# Patient Record
Sex: FEMALE | Race: BLACK OR AFRICAN AMERICAN | Employment: FULL TIME | URBAN - METROPOLITAN AREA
[De-identification: names, ages, dates, MRNs, and addresses within clinical notes are randomized per-mention and may not be internally consistent; named-entity substitution may affect disease eponyms.]

---

## 2017-03-03 ENCOUNTER — OFFICE VISIT (OUTPATIENT)
Dept: INTERNAL MEDICINE CLINIC | Facility: CLINIC | Age: 22
End: 2017-03-03

## 2017-03-03 VITALS
SYSTOLIC BLOOD PRESSURE: 100 MMHG | BODY MASS INDEX: 28.93 KG/M2 | HEIGHT: 66 IN | RESPIRATION RATE: 18 BRPM | DIASTOLIC BLOOD PRESSURE: 63 MMHG | HEART RATE: 86 BPM | TEMPERATURE: 98.4 F | WEIGHT: 180 LBS

## 2017-03-03 DIAGNOSIS — Z76.89 ENCOUNTER TO ESTABLISH CARE: ICD-10-CM

## 2017-03-03 DIAGNOSIS — Z13.31 DEPRESSION SCREENING: ICD-10-CM

## 2017-03-03 DIAGNOSIS — Z30.016 ENCOUNTER FOR INITIAL PRESCRIPTION OF TRANSDERMAL PATCH HORMONAL CONTRACEPTIVE DEVICE: ICD-10-CM

## 2017-03-03 DIAGNOSIS — F90.9 ATTENTION DEFICIT HYPERACTIVITY DISORDER (ADHD), UNSPECIFIED ADHD TYPE: Primary | ICD-10-CM

## 2017-03-03 LAB
HCG URINE, QL. (POC): NEGATIVE
VALID INTERNAL CONTROL?: YES

## 2017-03-03 RX ORDER — DEXTROAMPHETAMINE SACCHARATE, AMPHETAMINE ASPARTATE MONOHYDRATE, DEXTROAMPHETAMINE SULFATE AND AMPHETAMINE SULFATE 7.5; 7.5; 7.5; 7.5 MG/1; MG/1; MG/1; MG/1
30 CAPSULE, EXTENDED RELEASE ORAL
Qty: 30 CAP | Refills: 0 | Status: SHIPPED | OUTPATIENT
Start: 2017-03-03 | End: 2017-04-03 | Stop reason: SDUPTHER

## 2017-03-03 RX ORDER — DEXTROAMPHETAMINE SACCHARATE, AMPHETAMINE ASPARTATE MONOHYDRATE, DEXTROAMPHETAMINE SULFATE AND AMPHETAMINE SULFATE 7.5; 7.5; 7.5; 7.5 MG/1; MG/1; MG/1; MG/1
30 CAPSULE, EXTENDED RELEASE ORAL
COMMUNITY
End: 2017-03-03 | Stop reason: SDUPTHER

## 2017-03-03 NOTE — PROGRESS NOTES
Chief Complaint   Patient presents with   [address]     would like to discuss birth controll, and needs a medication refill. 1. Have you been to the ER, urgent care clinic since your last visit? Hospitalized since your last visit? No    2. Have you seen or consulted any other health care providers outside of the [address] [place] [address] since your last visit? Include any pap smears or colon screening. No     Do you have an [address] in place in the event that you have a healthcare crisis that could impact your decision making as it pertains to your health? NO    Would you like information about Advance Care Planning? NO    Information given.  NO

## 2017-03-03 NOTE — PATIENT INSTRUCTIONS
Intrauterine Device (IUD) for Birth Control: Care Instructions  Your Care Instructions    The intrauterine device (IUD) is used to prevent pregnancy. It's a small, plastic, T-shaped device. Your doctor places the IUD in your uterus. You are using either a hormonal IUD or a copper IUD. · Hormonal IUDs prevent pregnancy for 3 to 5 years, depending on which IUD is used. Once you have it, you don't have to do anything else to prevent pregnancy. · The copper IUD prevents pregnancy for 10 years. Once you have it, you don't have to do anything to prevent pregnancy. A string tied to the end of the IUD hangs down through the opening of the uterus (called the cervix) into the vagina. You can check that the IUD is in place by feeling for the string. The IUD usually stays in the uterus until your doctor removes it. Follow-up care is a key part of your treatment and safety. Be sure to make and go to all appointments, and call your doctor if you are having problems. It's also a good idea to know your test results and keep a list of the medicines you take. How can you care for yourself at home? How do you use the IUD? · Your doctor inserts the IUD. This takes only a few minutes and can be done at your doctor's office. · Your doctor may have you feel for the IUD string right after insertion, to be sure you know what it feels like. · Check for the string after every period. ¨ Insert a finger into your vagina and feel for the cervix, which is at the top of the vagina and feels harder than the rest of your vagina (some women say it feels like the tip of your nose). ¨ You should be able to feel the thin, plastic string coming out of the opening of your cervix. If you cannot feel the string, it doesn't always mean that the IUD is out of place. Sometimes the string is just difficult to feel or has been pulled up into the cervical canal (which will not harm you).   · Your doctor may want to see you 4 to 6 weeks after the IUD insertion, to make sure it is in place. What if you think the IUD is not in place? Always read the label for specific instructions. Here are some basic guidelines:  · Call your doctor and use backup birth control, such as a condom, or don't have intercourse until you know the IUD is working. · If you have had intercourse, you can use emergency contraception, such as the morning-after pill (Plan B). You can use emergency contraception for up to 5 days after having had intercourse, but it works best if you take it right away. What else do you need to know? · The IUD has side effects. ¨ The hormonal IUD usually reduces menstrual flow and cramping over time. It can also cause spotting, mood swings, and breast tenderness. ¨ The copper IUD can cause longer and heavier periods. · After an IUD is first put in, you may have some mild cramping and light spotting for 1 to 2 days. · The IUD doesn't protect against sexually transmitted infections (STIs), such as herpes or HIV/AIDS. If you're not sure whether your sex partner might have an STI, use a condom to protect against disease. When should you call for help? Call your doctor now or seek immediate medical care if:  · You have severe pain in your belly or pelvis. · You have severe vaginal bleeding. This means that you are soaking through your usual pads or tampons each hour for 2 or more hours. · You have vaginal discharge that smells bad. · You have a fever and chills. · You think you might be pregnant. Watch closely for changes in your health, and be sure to contact your doctor if:  · You cannot find the string of your IUD, or the string is shorter or longer than normal.  · You have any problems with your birth control method. · You think you may have been exposed to or have a sexually transmitted infection. Where can you learn more? Go to http://aparna-darlene.info/.   Enter O222 in the search box to learn more about \"Intrauterine Device (IUD) for Birth Control: Care Instructions. \"  Current as of: May 30, 2016  Content Version: 11.1  © 2108-5039 [employer]. Care instructions adapted under license by [employer] (which disclaims liability or warranty for this information). If you have questions about a medical condition or this instruction, always ask your healthcare professional. [address] any warranty or liability for your use of this information. Learning About Birth Control: The Patch  What is the patch? The patch is used to prevent pregnancy. It looks like a bandage and is put on the skin of your belly, rear end (buttocks), upper arm, or upper body (but not on a breast). The patch releases a regular dose of the hormones estrogen and progestin. These hormones prevent pregnancy in three ways. They thicken the mucus in the cervix. This makes it hard for sperm to travel into the uterus. They thin the lining of the uterus, which makes it harder for a fertilized egg to attach to the uterus. The hormones also can stop the ovaries from releasing an egg each month (ovulation). The patch provides birth control for 1 month at a time. You change the patch once a week for 3 weeks and then go without a patch for 1 week. During this week, you have your period. Your period may be very light. How well does it work? In the first year of use:  · When the patch is used exactly as directed, fewer than 1 woman out of 100 has an unplanned pregnancy. · When the patch is not used exactly as directed, 9 women out of 100 have an unplanned pregnancy. Be sure to tell your doctor about any health problems you have or medicines you take. He or she can help you choose the birth control method that is right for you. What are the advantages of the patch? · The patch is more effective for preventing pregnancy than barrier methods of birth control, such as the condom or diaphragm.   · It may reduce acne, heavy bleeding and cramping, and symptoms of premenstrual syndrome. · It's convenient. You put it on only 3 times each month. You do not have to interrupt sex to protect against pregnancy. · It's easy to check to see if you forgot to put one on. What are the disadvantages of the patch? · The patch doesn't protect against sexually transmitted infections (STIs), such as herpes or HIV/AIDS. If you aren't sure if your sex partner might have an STI, use a condom to protect against disease. · The patch may cause changes in your period. You may have little bleeding, skipped periods, or spotting. · It may cause mood changes, less interest in sex, or weight gain. · The patch contains estrogen. It may not be right for you if you have certain health problems or concerns. · It may increase your risk of blood clots. · It may be less effective in women who are overweight. · You must remember to change the patch on schedule. Where can you learn more? Go to http://aparna-darlene.info/. Enter E774 in the search box to learn more about \"Learning About Birth Control: The Patch. \"  Current as of: May 30, 2016  Content Version: 11.1  © 3635-4961 [employer]. Care instructions adapted under license by [employer] (which disclaims liability or warranty for this information). If you have questions about a medical condition or this instruction, always ask your healthcare professional. [place] [address] any warranty or liability for your use of this information.

## 2017-03-03 NOTE — PROGRESS NOTES
Subjective:      Melchor Lacy is a 23 y.o. female who is a new patient and is here to establish care and get medication refills. Previous followed by Dr. Juan Luis Luong in [place]. The following sections were reviewed & updated as appropriate: PMH, PL, PSH, FH, RxH, and SH. Mental Health Review  Patient is seen for ADHD. She was diagnosed sophomore year in college. Current treatment regimen includes: Adderall XR 30mg daily at 7am.  Medication compliance: all of the time. Pt reports the following side effects: nothing. [facility]  reviewed. Birth Control: she has been on this in the past, 2 different types but she cannot remember the name. She states both times she was nauseous. She states her cycles are mostly regular, coming every 4 weeks for 4-5 days. She is due for a PAP but states she has not had any irregular results. The patient denies smoking cigarettes, is not currently pregnant, does not have a history of blood clots or stroke, has never had breast cancer, does not have irregular or heavy periods, does not have liver or heart disease, and does not have a history of migraine headaches. Patient Active Problem List    Diagnosis Date Noted    ADHD (attention deficit hyperactivity disorder) 03/03/2017     Current Outpatient Prescriptions   Medication Sig Dispense Refill    [START ON 3/4/2017] norelgestromin-ethinyl estradiol (ORTHO EVRA) 150-35 mcg/24 hr 1 Patch by TransDERmal route Every Saturday. 12 Patch 0    amphetamine-dextroamphetamine XR (ADDERALL XR) 30 mg XR capsule Take 1 Cap (30 mg total) by mouth every morning. Max Daily Amount: 30 mg 30 Cap 0     No Known Allergies  History reviewed. No pertinent past medical history.   Family History   Problem Relation Age of Onset    No Known Problems Mother     Arthritis-osteo Father     Cancer Maternal Grandmother      breast    Cancer Paternal Grandfather      thyroid cancer- questionable history     Social History   Substance Use Topics    Smoking status: Never Smoker    Smokeless tobacco: Not on file    Alcohol use Yes        Review of Systems    A comprehensive review of systems was negative except for that written in the HPI. Objective:     Visit Vitals    /63 (BP 1 Location: Left arm, BP Patient Position: Sitting)    Pulse 86    Temp 98.4 °F (36.9 °C) (Oral)    Resp 18    Ht 5' 5.5\" (1.664 m)    Wt 180 lb (81.6 kg)    LMP 02/03/2017 (Exact Date)    BMI 29.5 kg/m2     General appearance: alert, cooperative, no distress, appears stated age  Lungs: clear to auscultation bilaterally  Heart: regular rate and rhythm, S1, S2 normal, no murmur, click, rub or gallop  Extremities: extremities normal, atraumatic, no cyanosis or edema  Neurologic: Grossly normal  Psych: appropriate speech, mood, affect  Nursing note and vitals reviewed  Assessment/Plan:       ICD-10-CM ICD-9-CM    1. Attention deficit hyperactivity disorder (ADHD), unspecified ADHD type F90.9 314.01 amphetamine-dextroamphetamine XR (ADDERALL XR) 30 mg XR capsule   2. Encounter for initial prescription of transdermal patch hormonal contraceptive device Z30.016 V25.02 norelgestromin-ethinyl estradiol (ORTHO EVRA) 150-35 mcg/24 hr      REFERRAL TO GYNECOLOGY      AMB POC URINE PREGNANCY TEST, VISUAL COLOR COMPARISON   3. Encounter to establish care Z76.89 V65.8    4. Depression screening Z13.89 V79.0      She will return for PAP of follow up with GYN for this asap, soonest appointment. She requests birth control in the interval due to sexual activity that is not always with a condom. Follow-up Disposition:  Return for Schedule your annual physical with fasting labs. Advised her to call back or return to office if symptoms worsen/change/persist.  Discussed expected course/resolution/complications of diagnosis in detail with patient. Medication risks/benefits/costs/interactions/alternatives discussed with patient.   She was given an after visit summary which includes diagnoses, current medications, & vitals. She expressed understanding with the diagnosis and plan.

## 2017-03-03 NOTE — MR AVS SNAPSHOT
Visit Information Date & Time Provider Department Dept. Phone Encounter #  
 3/3/2017 10:30 AM Billy Ibarra NP [facility] Internal Medicine [phone] [id number] Follow-up Instructions Return for Schedule your annual physical with fasting labs. Upcoming Health Maintenance Date Due  
 HPV AGE 9Y-34Y (1 of 3 - Female 3 Dose Series) 1/4/2004 DTaP/Tdap/Td series (1 - Tdap) 1/4/2014 PAP AKA CERVICAL CYTOLOGY 1/4/2014 INFLUENZA AGE 9 TO ADULT 8/1/2016 Allergies as of 3/3/2017  Review Complete On: 3/3/2017 By: Billy Ibarra NP No Known Allergies Current Immunizations  Never Reviewed No immunizations on file. Not reviewed this visit You Were Diagnosed With   
  
 Codes Comments Attention deficit hyperactivity disorder (ADHD), unspecified ADHD type    -  Primary ICD-10-CM: F90.9 ICD-9-CM: 314.01 Encounter for initial prescription of transdermal patch hormonal contraceptive device     ICD-10-CM: X83.253 ICD-9-CM: V25.02 Encounter to establish care     ICD-10-CM: Z76.89 
ICD-9-CM: V65.8 Vitals BP  
  
  
  
  
  
 100/63 (BP 1 Location: Left arm, BP Patient Position: Sitting) Vitals History BMI and BSA Data Body Mass Index Body Surface Area  
 29.5 kg/m 2 1.94 m 2 Preferred Pharmacy Pharmacy Name Phone [id number] [phone] Your Updated Medication List  
  
   
This list is accurate as of: 3/3/17 11:10 AM.  Always use your most recent med list.  
  
  
  
  
 amphetamine-dextroamphetamine XR 30 mg XR capsule Commonly known as:  ADDERALL XR Take 1 Cap (30 mg total) by mouth every morning. Max Daily Amount: 30 mg  
  
 norelgestromin-ethinyl estradiol 150-35 mcg/24 hr  
Commonly known as:  ORTHO EVRA  
1 Patch by TransDERmal route Every Saturday. Start taking on:  3/4/2017 Prescriptions Printed Refills  
 amphetamine-dextroamphetamine XR (ADDERALL XR) 30 mg XR capsule 0 Sig: Take 1 Cap (30 mg total) by mouth every morning. Max Daily Amount: 30 mg  
 Class: Print Route: Oral  
  
Prescriptions Sent to Pharmacy Refills  
 norelgestromin-ethinyl estradiol (ORTHO EVRA) 150-35 mcg/24 hr 0 Starting on: 3/4/2017 Si Patch by TransDERmal route Every Saturday. Class: Normal  
 Pharmacy: [address] [facility] Ph #: [phone] Route: TransDERmal  
  
We Performed the Following AMB POC URINE PREGNANCY TEST, VISUAL COLOR COMPARISON [36625 CPT(R)] REFERRAL TO GYNECOLOGY [REF30 Custom] Comments:  
 Please evaluate patient for IUD placement. Follow-up Instructions Return for Schedule your annual physical with fasting labs. Referral Information Referral ID Referred By Referred To  
  
 [id number] SKIFF, Lawernce Sol, GREYSON   
   [address] [facility], [address] Phone: [phone] Fax: [phone] Visits Status Start Date End Date 1 New Request 3/3/17 3/3/18 If your referral has a status of pending review or denied, additional information will be sent to support the outcome of this decision. Patient Instructions Intrauterine Device (IUD) for Birth Control: Care Instructions Your Care Instructions The intrauterine device (IUD) is used to prevent pregnancy. It's a small, plastic, T-shaped device. Your doctor places the IUD in your uterus. You are using either a hormonal IUD or a copper IUD. · Hormonal IUDs prevent pregnancy for 3 to 5 years, depending on which IUD is used. Once you have it, you don't have to do anything else to prevent pregnancy. · The copper IUD prevents pregnancy for 10 years. Once you have it, you don't have to do anything to prevent pregnancy. A string tied to the end of the IUD hangs down through the opening of the uterus (called the cervix) into the vagina. You can check that the IUD is in place by feeling for the string. The IUD usually stays in the uterus until your doctor removes it. Follow-up care is a key part of your treatment and safety. Be sure to make and go to all appointments, and call your doctor if you are having problems. It's also a good idea to know your test results and keep a list of the medicines you take. How can you care for yourself at home? How do you use the IUD? · Your doctor inserts the IUD. This takes only a few minutes and can be done at your doctor's office. · Your doctor may have you feel for the IUD string right after insertion, to be sure you know what it feels like. · Check for the string after every period. ¨ Insert a finger into your vagina and feel for the cervix, which is at the top of the vagina and feels harder than the rest of your vagina (some women say it feels like the tip of your nose). ¨ You should be able to feel the thin, plastic string coming out of the opening of your cervix. If you cannot feel the string, it doesn't always mean that the IUD is out of place. Sometimes the string is just difficult to feel or has been pulled up into the cervical canal (which will not harm you). · Your doctor may want to see you 4 to 6 weeks after the IUD insertion, to make sure it is in place. What if you think the IUD is not in place? Always read the label for specific instructions. Here are some basic guidelines: 
· Call your doctor and use backup birth control, such as a condom, or don't have intercourse until you know the IUD is working. · If you have had intercourse, you can use emergency contraception, such as the morning-after pill (Plan B). You can use emergency contraception for up to 5 days after having had intercourse, but it works best if you take it right away. What else do you need to know? · The IUD has side effects. ¨ The hormonal IUD usually reduces menstrual flow and cramping over time. It can also cause spotting, mood swings, and breast tenderness. ¨ The copper IUD can cause longer and heavier periods. · After an IUD is first put in, you may have some mild cramping and light spotting for 1 to 2 days. · The IUD doesn't protect against sexually transmitted infections (STIs), such as herpes or HIV/AIDS. If you're not sure whether your sex partner might have an STI, use a condom to protect against disease. When should you call for help? Call your doctor now or seek immediate medical care if: 
· You have severe pain in your belly or pelvis. · You have severe vaginal bleeding. This means that you are soaking through your usual pads or tampons each hour for 2 or more hours. · You have vaginal discharge that smells bad. · You have a fever and chills. · You think you might be pregnant. Watch closely for changes in your health, and be sure to contact your doctor if: 
· You cannot find the string of your IUD, or the string is shorter or longer than normal. 
· You have any problems with your birth control method. · You think you may have been exposed to or have a sexually transmitted infection. Where can you learn more? Go to http://aparna-darlene.info/. Enter G614 in the search box to learn more about \"Intrauterine Device (IUD) for Birth Control: Care Instructions. \" Current as of: May 30, 2016 Content Version: 11.1 © 0098-6661 ClickToShop. Care instructions adapted under license by 6Wunderkinder (which disclaims liability or warranty for this information). If you have questions about a medical condition or this instruction, always ask your healthcare professional. [place] [address] any warranty or liability for your use of this information. Learning About Birth Control: The Patch What is the patch? The patch is used to prevent pregnancy. It looks like a bandage and is put on the skin of your belly, rear end (buttocks), upper arm, or upper body (but not on a breast). The patch releases a regular dose of the hormones estrogen and progestin. These hormones prevent pregnancy in three ways. They thicken the mucus in the cervix. This makes it hard for sperm to travel into the uterus. They thin the lining of the uterus, which makes it harder for a fertilized egg to attach to the uterus. The hormones also can stop the ovaries from releasing an egg each month (ovulation). The patch provides birth control for 1 month at a time. You change the patch once a week for 3 weeks and then go without a patch for 1 week. During this week, you have your period. Your period may be very light. How well does it work? In the first year of use: · When the patch is used exactly as directed, fewer than 1 woman out of 100 has an unplanned pregnancy. · When the patch is not used exactly as directed, 9 women out of 100 have an unplanned pregnancy. Be sure to tell your doctor about any health problems you have or medicines you take. He or she can help you choose the birth control method that is right for you. What are the advantages of the patch? · The patch is more effective for preventing pregnancy than barrier methods of birth control, such as the condom or diaphragm. · It may reduce acne, heavy bleeding and cramping, and symptoms of premenstrual syndrome. · It's convenient. You put it on only 3 times each month. You do not have to interrupt sex to protect against pregnancy. · It's easy to check to see if you forgot to put one on. What are the disadvantages of the patch? · The patch doesn't protect against sexually transmitted infections (STIs), such as herpes or HIV/AIDS. If you aren't sure if your sex partner might have an STI, use a condom to protect against disease. · The patch may cause changes in your period. You may have little bleeding, skipped periods, or spotting. · It may cause mood changes, less interest in sex, or weight gain. · The patch contains estrogen. It may not be right for you if you have certain health problems or concerns. · It may increase your risk of blood clots. · It may be less effective in women who are overweight. · You must remember to change the patch on schedule. Where can you learn more? Go to http://aparna-darlene.info/. Enter K558 in the search box to learn more about \"Learning About Birth Control: The Patch. \" 
Current as of: May 30, 2016 Content Version: 11.1 © 7165-4621 ViaBill. Care instructions adapted under license by Powered Now (which disclaims liability or warranty for this information). If you have questions about a medical condition or this instruction, always ask your healthcare professional. [place] [address] any warranty or liability for your use of this information. Introducing [facility] & HEALTH SERVICES! [facility] introduces Automile patient portal. Now you can access parts of your medical record, email your doctor's office, and request medication refills online. 1. In your internet browser, go to [email or web address]/Convey Computer 2. Click on the First Time User? Click Here link in the Sign In box. You will see the New Member Sign Up page. 3. Enter your Automile Access Code exactly as it appears below. You will not need to use this code after youve completed the sign-up process. If you do not sign up before the expiration date, you must request a new code. · Automile Access Code: [email or web address] Expires: 6/1/2017 10:21 AM 
 
4. Enter the last four digits of your Social Security Number (xxxx) and Date of Birth (mm/dd/yyyy) as indicated and click Submit. You will be taken to the next sign-up page. 5. Create a LegCyte ID. This will be your LegCyte login ID and cannot be changed, so think of one that is secure and easy to remember. 6. Create a LegCyte password. You can change your password at any time. 7. Enter your Password Reset Question and Answer. This can be used at a later time if you forget your password. 8. Enter your e-mail address. You will receive e-mail notification when new information is available in [address] 9. Click Sign Up. You can now view and download portions of your medical record. 10. Click the Download Summary menu link to download a portable copy of your medical information. If you have questions, please visit the Frequently Asked Questions section of the LegCyte website. Remember, LegCyte is NOT to be used for urgent needs. For medical emergencies, dial 911. Now available from your iPhone and Android! Please provide this summary of care documentation to your next provider. Your primary care clinician is listed as Brynn Heath. If you have any questions after today's visit, please call [phone].

## 2017-03-07 ENCOUNTER — OFFICE VISIT (OUTPATIENT)
Dept: INTERNAL MEDICINE CLINIC | Facility: CLINIC | Age: 22
End: 2017-03-07

## 2017-03-07 VITALS
WEIGHT: 182.4 LBS | SYSTOLIC BLOOD PRESSURE: 111 MMHG | OXYGEN SATURATION: 98 % | TEMPERATURE: 99.2 F | BODY MASS INDEX: 29.32 KG/M2 | HEIGHT: 66 IN | DIASTOLIC BLOOD PRESSURE: 73 MMHG | RESPIRATION RATE: 18 BRPM | HEART RATE: 83 BPM

## 2017-03-07 DIAGNOSIS — E66.3 OVERWEIGHT (BMI 25.0-29.9): ICD-10-CM

## 2017-03-07 DIAGNOSIS — Z00.00 ENCOUNTER FOR GENERAL ADULT MEDICAL EXAMINATION W/O ABNORMAL FINDINGS: Primary | ICD-10-CM

## 2017-03-07 NOTE — MR AVS SNAPSHOT
Visit Information Date & Time Provider Department Dept. Phone Encounter #  
 3/7/2017  9:45 AM John Caraballo NP [facility] Internal Medicine [phone] Follow-up Instructions Return in about 3 months (around 6/7/2017) for ADHD, please sign medical release for HPV records. Upcoming Health Maintenance Date Due  
 HPV AGE 9Y-34Y (1 of 3 - Female 3 Dose Series) 1/4/2004 DTaP/Tdap/Td series (1 - Tdap) 1/4/2014 PAP AKA CERVICAL CYTOLOGY 1/4/2014 Allergies as of 3/7/2017  Review Complete On: 3/7/2017 By: John Caraballo NP No Known Allergies Current Immunizations  Reviewed on 3/7/2017 No immunizations on file. Reviewed by John Caraballo NP on 3/7/2017 at 10:03 AM  
You Were Diagnosed With   
  
 Codes Comments Encounter for general adult medical examination w/o abnormal findings    -  Primary ICD-10-CM: Z00.00 ICD-9-CM: V70.9 Overweight (BMI 25.0-29. 9)     ICD-10-CM: Y65.8 ICD-9-CM: 278.02 Vitals BP Pulse Temp Resp Height(growth percentile) Weight(growth percentile) 111/73 (BP 1 Location: Left arm, BP Patient Position: Sitting) 83 99.2 °F (37.3 °C) (Oral) 18 5' 5.5\" (1.664 m) 182 lb 6.4 oz (82.7 kg) LMP SpO2 BMI OB Status Smoking Status 03/01/2017 98% 29.89 kg/m2 Having regular periods Never Smoker Vitals History BMI and BSA Data Body Mass Index Body Surface Area  
 29.89 kg/m 2 1.95 m 2 Preferred Pharmacy Pharmacy Name Phone [id number] [phone] Your Updated Medication List  
  
   
This list is accurate as of: 3/7/17 10:12 AM.  Always use your most recent med list.  
  
  
  
  
 amphetamine-dextroamphetamine XR 30 mg XR capsule Commonly known as:  ADDERALL XR Take 1 Cap (30 mg total) by mouth every morning.   Max Daily Amount: 30 mg  
  
 norelgestromin-ethinyl estradiol 150-35 mcg/24 hr  
 Commonly known as:  ORTHO EVRA  
1 Patch by TransDERmal route Every Saturday. We Performed the Following CBC WITH AUTOMATED DIFF [77485 CPT(R)] LIPID PANEL [03680 CPT(R)] METABOLIC PANEL, COMPREHENSIVE [74051 CPT(R)] TSH 3RD GENERATION [47822 CPT(R)] Follow-up Instructions Return in about 3 months (around 6/7/2017) for ADHD, please sign medical release for HPV records. Patient Instructions Starting a Weight Loss Plan: Care Instructions Your Care Instructions If you are thinking about losing weight, it can be hard to know where to start. Your doctor can help you set up a weight loss plan that best meets your needs. You may want to take a class on nutrition or exercise, or join a weight loss support group. If you have questions about how to make changes to your eating or exercise habits, ask your doctor about seeing a registered dietitian or an exercise specialist. 
It can be a big challenge to lose weight. But you do not have to make huge changes at once. Make small changes, and stick with them. When those changes become habit, add a few more changes. If you do not think you are ready to make changes right now, try to pick a date in the future. Make an appointment to see your doctor to discuss whether the time is right for you to start a plan. Follow-up care is a key part of your treatment and safety. Be sure to make and go to all appointments, and call your doctor if you are having problems. Its also a good idea to know your test results and keep a list of the medicines you take. How can you care for yourself at home? · Set realistic goals. Many people expect to lose much more weight than is likely. A weight loss of 5% to 10% of your body weight may be enough to improve your health. · Get family and friends involved to provide support. Talk to them about why you are trying to lose weight, and ask them to help.  They can help by participating in exercise and having meals with you, even if they may be eating something different. · Find what works best for you. If you do not have time or do not like to cook, a program that offers meal replacement bars or shakes may be better for you. Or if you like to prepare meals, finding a plan that includes daily menus and recipes may be best. 
· Ask your doctor about other health professionals who can help you achieve your weight loss goals. ¨ A dietitian can help you make healthy changes in your diet. ¨ An exercise specialist or  can help you develop a safe and effective exercise program. 
¨ A counselor or psychiatrist can help you cope with issues such as depression, anxiety, or family problems that can make it hard to focus on weight loss. · Consider joining a support group for people who are trying to lose weight. Your doctor can suggest groups in your area. Where can you learn more? Go to http://aparna-darlene.info/. Enter J275 in the search box to learn more about \"Starting a Weight Loss Plan: Care Instructions. \" Current as of: February 16, 2016 Content Version: 11.1 © 6971-9368 EastMeetEast. Care instructions adapted under license by Dualog (which disclaims liability or warranty for this information). If you have questions about a medical condition or this instruction, always ask your healthcare professional. [address] any warranty or liability for your use of this information. Introducing [facility] & HEALTH SERVICES! [facility] introduces Dibbz patient portal. Now you can access parts of your medical record, email your doctor's office, and request medication refills online. 1. In your internet browser, go to [email or web address] Mobile [email or web address] [email or web address] 2. Click on the First Time User? Click Here link in the Sign In box. You will see the New Member Sign Up page. 3. Enter your Panda Graphics Access Code exactly as it appears below. You will not need to use this code after youve completed the sign-up process. If you do not sign up before the expiration date, you must request a new code. · Panda Graphics Access Code: [email or web address] Expires: 6/1/2017 10:21 AM 
 
4. Enter the last four digits of your Social Security Number (xxxx) and Date of Birth (mm/dd/yyyy) as indicated and click Submit. You will be taken to the next sign-up page. 5. Create a Panda Graphics ID. This will be your Panda Graphics login ID and cannot be changed, so think of one that is secure and easy to remember. 6. Create a Panda Graphics password. You can change your password at any time. 7. Enter your Password Reset Question and Answer. This can be used at a later time if you forget your password. 8. Enter your e-mail address. You will receive e-mail notification when new information is available in [address] 9. Click Sign Up. You can now view and download portions of your medical record. 10. Click the Download Summary menu link to download a portable copy of your medical information. If you have questions, please visit the Frequently Asked Questions section of the Panda Graphics website. Remember, Panda Graphics is NOT to be used for urgent needs. For medical emergencies, dial 911. Now available from your iPhone and Android! Please provide this summary of care documentation to your next provider. Your primary care clinician is listed as Sunshine Gabriel. If you have any questions after today's visit, please call [phone].

## 2017-03-07 NOTE — PROGRESS NOTES
Subjective:      Oksana Payne is a 23 y.o. female who presents today for CPE. Health Maintenance  Immunizations:    Influenza: up to date. Tetanus: unknown, record requested. Gardasil: up to date. Cancer screening:    Cervical: reviewed guidelines, not up to date - she has apt scheduled with GYN for this week. Breast: reviewed guidelines, not up to date - will be done by gyn. Reviewed SBE with her. BMI: she states she used to be 210lbs, she is trying to run regularly. She does not like fried foods and rarely eats fast food. Patient Care Team:  Sunshine Gabriel NP as PCP - General (Nurse Practitioner)     The following sections were reviewed & updated as appropriate: PMH, PSH, FH, and SH. Patient Active Problem List   Diagnosis Code    ADHD (attention deficit hyperactivity disorder) F90.9      Prior to Admission medications    Medication Sig Start Date End Date Taking? Authorizing Provider   amphetamine-dextroamphetamine XR (ADDERALL XR) 30 mg XR capsule Take 1 Cap (30 mg total) by mouth every morning. Max Daily Amount: 30 mg 3/3/17  Yes Sunshine Gabriel NP   norelgestromin-ethinyl estradiol (ORTHO EVRA) 150-35 mcg/24 hr 1 Patch by TransDERmal route Every Saturday. 3/4/17   Sunshine Gabriel NP      No Known Allergies     Family History   Problem Relation Age of Onset    No Known Problems Mother     Arthritis-osteo Father     Cancer Maternal Grandmother      breast    Cancer Paternal Grandfather      thyroid cancer- questionable history     Social History   Substance Use Topics    Smoking status: Never Smoker    Smokeless tobacco: Never Used    Alcohol use Yes        Review of Systems    A comprehensive review of systems was negative except for that written in the HPI.      Objective:     Visit Vitals    /73 (BP 1 Location: Left arm, BP Patient Position: Sitting)    Pulse 83    Temp 99.2 °F (37.3 °C) (Oral)    Resp 18    Ht 5' 5.5\" (1.664 m)    Wt 182 lb 6.4 oz (82.7 kg)    [facility] 03/01/2017    SpO2 98%    BMI 29.89 kg/m2     General:  Alert, cooperative, no distress, appears stated age. Head:  Normocephalic, without obvious abnormality, atraumatic. Eyes:  Conjunctivae/corneas clear. PERRL, EOMs intact. Ears:  Normal TMs and external ear canals both ears. Nose: Nares normal. Septum midline. Mucosa normal. No drainage or sinus tenderness. Throat: Lips, mucosa, and tongue normal. Teeth and gums normal.   Neck: Supple, symmetrical, trachea midline, no adenopathy, thyroid: no enlargement/tenderness/nodules. Back:   Symmetric, no curvature. ROM normal. No CVA tenderness. Lungs:   Clear to auscultation bilaterally. Chest wall:  No tenderness or deformity. Heart:  Regular rate and rhythm, S1, S2 normal, no murmur, click, rub or gallop. Abdomen:   Soft, non-tender. Bowel sounds normal. No masses,  No organomegaly. Extremities: Extremities normal, atraumatic, no cyanosis or edema. Pulses: 2+ and symmetric all extremities. Skin: Skin color, texture, turgor normal. No rashes or lesions. Lymph nodes: Cervical, supraclavicular nodes normal.   Neurologic: CNII-XII intact. Normal strength, sensation  throughout. Nursing note and vitals reviewed  Assessment/Plan:       ICD-10-CM ICD-9-CM    1. Encounter for general adult medical examination w/o abnormal findings Z00.00 V70.9 CBC WITH AUTOMATED DIFF      LIPID PANEL      METABOLIC PANEL, COMPREHENSIVE      TSH 3RD GENERATION   2. Overweight (BMI 25.0-29. 9) E66.3 278.02           Advised her to call back or return to office if symptoms worsen/change/persist.  Discussed expected course/resolution/complications of diagnosis in detail with patient. Medication risks/benefits/costs/interactions/alternatives discussed with patient. She was given an after visit summary which includes diagnoses, current medications, & vitals. She expressed understanding with the diagnosis and plan.

## 2017-03-07 NOTE — PATIENT INSTRUCTIONS

## 2017-03-07 NOTE — PROGRESS NOTES
Room 4    Chief Complaint   Patient presents with    Complete Physical     Patient has Pap scheduled for later this week     1. Have you been to the ER, urgent care clinic since your last visit? Hospitalized since your last visit? Yes Reason for visit: For pink eye, patient went to [facility] in February 2. Have you seen or consulted any other health care providers outside of the [address] [place] [address] since your last visit? Include any pap smears or colon screening. No     Health Maintenance Due   Topic Date Due    HPV AGE 9Y-34Y (1 of 3 - Female 3 Dose Series) 01/04/2004    DTaP/Tdap/Td series (1 - Tdap) 01/04/2014    PAP AKA CERVICAL CYTOLOGY  01/04/2014    INFLUENZA AGE 9 TO ADULT  08/01/2016     Do you have an [address] in place in the event that you have a healthcare crisis that could impact your decision making as it pertains to your health? NO    Would you like information about Advance Care Planning? NO    Information given.  NO

## 2017-03-08 LAB
ALBUMIN SERPL-MCNC: 4.2 G/DL (ref 3.5–5.5)
ALBUMIN/GLOB SERPL: 1.4 {RATIO} (ref 1.1–2.5)
ALP SERPL-CCNC: 57 IU/L (ref 39–117)
ALT SERPL-CCNC: 17 IU/L (ref 0–32)
AST SERPL-CCNC: 19 IU/L (ref 0–40)
BASOPHILS # BLD AUTO: 0 X10E3/UL (ref 0–0.2)
BASOPHILS NFR BLD AUTO: 0 %
BILIRUB SERPL-MCNC: <0.2 MG/DL (ref 0–1.2)
BUN SERPL-MCNC: 8 MG/DL (ref 6–20)
BUN/CREAT SERPL: 11 (ref 8–20)
CALCIUM SERPL-MCNC: 9 MG/DL (ref 8.7–10.2)
CHLORIDE SERPL-SCNC: 102 MMOL/L (ref 96–106)
CHOLEST SERPL-MCNC: 171 MG/DL (ref 100–199)
CO2 SERPL-SCNC: 24 MMOL/L (ref 18–29)
CREAT SERPL-MCNC: 0.71 MG/DL (ref 0.57–1)
EOSINOPHIL # BLD AUTO: 0 X10E3/UL (ref 0–0.4)
EOSINOPHIL NFR BLD AUTO: 1 %
ERYTHROCYTE [DISTWIDTH] IN BLOOD BY AUTOMATED COUNT: 15.1 % (ref 12.3–15.4)
GLOBULIN SER CALC-MCNC: 2.9 G/DL (ref 1.5–4.5)
GLUCOSE SERPL-MCNC: 90 MG/DL (ref 65–99)
HCT VFR BLD AUTO: 39.7 % (ref 34–46.6)
HDLC SERPL-MCNC: 66 MG/DL
HGB BLD-MCNC: 12.4 G/DL (ref 11.1–15.9)
IMM GRANULOCYTES # BLD: 0 X10E3/UL (ref 0–0.1)
IMM GRANULOCYTES NFR BLD: 0 %
LDLC SERPL CALC-MCNC: 88 MG/DL (ref 0–99)
LYMPHOCYTES # BLD AUTO: 1.1 X10E3/UL (ref 0.7–3.1)
LYMPHOCYTES NFR BLD AUTO: 25 %
MCH RBC QN AUTO: 25.8 PG (ref 26.6–33)
MCHC RBC AUTO-ENTMCNC: 31.2 G/DL (ref 31.5–35.7)
MCV RBC AUTO: 83 FL (ref 79–97)
MONOCYTES # BLD AUTO: 0.3 X10E3/UL (ref 0.1–0.9)
MONOCYTES NFR BLD AUTO: 7 %
NEUTROPHILS # BLD AUTO: 3 X10E3/UL (ref 1.4–7)
NEUTROPHILS NFR BLD AUTO: 67 %
PLATELET # BLD AUTO: 281 X10E3/UL (ref 150–379)
POTASSIUM SERPL-SCNC: 4 MMOL/L (ref 3.5–5.2)
PROT SERPL-MCNC: 7.1 G/DL (ref 6–8.5)
RBC # BLD AUTO: 4.81 X10E6/UL (ref 3.77–5.28)
SODIUM SERPL-SCNC: 141 MMOL/L (ref 134–144)
TRIGL SERPL-MCNC: 87 MG/DL (ref 0–149)
TSH SERPL DL<=0.005 MIU/L-ACNC: 3.35 UIU/ML (ref 0.45–4.5)
VLDLC SERPL CALC-MCNC: 17 MG/DL (ref 5–40)
WBC # BLD AUTO: 4.5 X10E3/UL (ref 3.4–10.8)

## 2017-04-03 DIAGNOSIS — F90.9 ATTENTION DEFICIT HYPERACTIVITY DISORDER (ADHD), UNSPECIFIED ADHD TYPE: ICD-10-CM

## 2017-04-04 RX ORDER — DEXTROAMPHETAMINE SACCHARATE, AMPHETAMINE ASPARTATE MONOHYDRATE, DEXTROAMPHETAMINE SULFATE AND AMPHETAMINE SULFATE 7.5; 7.5; 7.5; 7.5 MG/1; MG/1; MG/1; MG/1
30 CAPSULE, EXTENDED RELEASE ORAL
Qty: 30 CAP | Refills: 0 | Status: SHIPPED | OUTPATIENT
Start: 2017-04-04 | End: 2017-05-09 | Stop reason: SDUPTHER

## 2017-05-09 DIAGNOSIS — F90.9 ATTENTION DEFICIT HYPERACTIVITY DISORDER (ADHD), UNSPECIFIED ADHD TYPE: ICD-10-CM

## 2017-05-11 RX ORDER — DEXTROAMPHETAMINE SACCHARATE, AMPHETAMINE ASPARTATE MONOHYDRATE, DEXTROAMPHETAMINE SULFATE AND AMPHETAMINE SULFATE 7.5; 7.5; 7.5; 7.5 MG/1; MG/1; MG/1; MG/1
30 CAPSULE, EXTENDED RELEASE ORAL
Qty: 30 CAP | Refills: 0 | Status: SHIPPED | OUTPATIENT
Start: 2017-05-11 | End: 2017-06-26 | Stop reason: SDUPTHER

## 2017-06-01 ENCOUNTER — HOSPITAL ENCOUNTER (OUTPATIENT)
Dept: MAMMOGRAPHY | Age: 22
Discharge: HOME | End: 2017-06-01
Attending: OBSTETRICS & GYNECOLOGY
Payer: COMMERCIAL

## 2017-06-01 DIAGNOSIS — N63.0 LUMP OR MASS IN BREAST: ICD-10-CM

## 2017-06-01 PROCEDURE — 76641 ULTRASOUND BREAST COMPLETE: CPT

## 2017-06-01 PROCEDURE — 76642 ULTRASOUND BREAST LIMITED: CPT

## 2017-06-26 ENCOUNTER — OFFICE VISIT (OUTPATIENT)
Dept: INTERNAL MEDICINE CLINIC | Facility: CLINIC | Age: 22
End: 2017-06-26

## 2017-06-26 VITALS
HEIGHT: 65 IN | BODY MASS INDEX: 33.15 KG/M2 | TEMPERATURE: 98 F | RESPIRATION RATE: 18 BRPM | DIASTOLIC BLOOD PRESSURE: 66 MMHG | HEART RATE: 80 BPM | SYSTOLIC BLOOD PRESSURE: 98 MMHG | WEIGHT: 199 LBS

## 2017-06-26 DIAGNOSIS — L50.1 IDIOPATHIC URTICARIA: ICD-10-CM

## 2017-06-26 DIAGNOSIS — F90.9 ATTENTION DEFICIT HYPERACTIVITY DISORDER (ADHD), UNSPECIFIED ADHD TYPE: Primary | ICD-10-CM

## 2017-06-26 DIAGNOSIS — D50.9 IRON DEFICIENCY ANEMIA, UNSPECIFIED IRON DEFICIENCY ANEMIA TYPE: ICD-10-CM

## 2017-06-26 DIAGNOSIS — L70.9 ACNE, UNSPECIFIED ACNE TYPE: ICD-10-CM

## 2017-06-26 PROBLEM — E66.3 OVERWEIGHT (BMI 25.0-29.9): Status: RESOLVED | Noted: 2017-03-07 | Resolved: 2017-06-26

## 2017-06-26 RX ORDER — DEXTROAMPHETAMINE SACCHARATE, AMPHETAMINE ASPARTATE MONOHYDRATE, DEXTROAMPHETAMINE SULFATE AND AMPHETAMINE SULFATE 7.5; 7.5; 7.5; 7.5 MG/1; MG/1; MG/1; MG/1
30 CAPSULE, EXTENDED RELEASE ORAL
Qty: 30 CAP | Refills: 0 | Status: SHIPPED | OUTPATIENT
Start: 2017-06-26 | End: 2017-07-25 | Stop reason: SDUPTHER

## 2017-06-26 RX ORDER — LORATADINE 10 MG/1
10 TABLET ORAL DAILY
Qty: 30 TAB | Refills: 0 | Status: SHIPPED | OUTPATIENT
Start: 2017-06-26 | End: 2019-04-15

## 2017-06-26 NOTE — PROGRESS NOTES
Subjective:      Cecelia Causey is a 23 y.o. female who presents today for     Mental Health Review  Patient is seen for ADHD. Current treatment regimen includes: Adderall XR. Medication compliance: all of the time. Pt reports the following side effects: nothing. [facility]  [facility]. Anemia: she has struggled with taking iron supplements because it makes her constipated. She has tried to eat more iron rich foods like spinach. She denies easy bruising, fatigue, dizziness. She states she does yawn a lot. BMI 33: She gained 20 lbs since March. She had an  in March and states she started eating a lot to cope. Weight loss goals:  1. Will creat a food and exercise plan (including meal prep), will make a meal schedule  2. Will join a gym  3. Will get exercise at least 3-5 times a week for 45 min to 1 hour  4. Will introduce more iron rich foods into diet  5. Continue to only drink water, limit creamer and sugar in coffees    The following sections were reviewed & updated as appropriate: PMH, PSH, FH, and SH. Patient Active Problem List   Diagnosis Code    ADHD (attention deficit hyperactivity disorder) F90.9    Overweight (BMI 25.0-29. 9) E66.3      Prior to Admission medications    Medication Sig Start Date End Date Taking? Authorizing Provider   LEVONORGESTREL (KYLEENA IU) by IntraUTERine route. Yes Historical Provider   amphetamine-dextroamphetamine XR (ADDERALL XR) 30 mg XR capsule Take 1 Cap (30 mg total) by mouth every morningEarliest Fill Date: 17.   Max Daily Amount: 30 mg 17  Yes Janey Jett NP      Allergies   Allergen Reactions    Codeine Hives      Family History   Problem Relation Age of Onset    No Known Problems Mother     Arthritis-osteo Father     Cancer Maternal Grandmother      breast    Breast Cancer Maternal Grandmother     Cancer Paternal Grandfather      thyroid cancer- questionable history     Social History   Substance Use Topics    Smoking status: Never Smoker    Smokeless tobacco: Never Used    Alcohol use Yes        Review of Systems    A comprehensive review of systems was negative except for that written in the HPI. Objective:     Visit Vitals    BP 98/66    Pulse 80    Temp 98 °F (36.7 °C) (Oral)    Resp 18    Ht 5' 5\" (1.651 m)    Wt 199 lb (90.3 kg)    LMP 06/24/2017    BMI 33.12 kg/m2     General appearance: alert, cooperative, no distress, appears stated age  Head: Normocephalic, without obvious abnormality, atraumatic  Eyes: negative  Lungs: clear to auscultation bilaterally  Heart: regular rate and rhythm, S1, S2 normal, no murmur, click, rub or gallop  Extremities: extremities normal, atraumatic, no cyanosis or edema  Pulses: 2+ and symmetric  Neurologic: Grossly normal  Skin: no visible acne noted at this time  Psych: appropriate mood, speech affect  Nursing note and vitals reviewed  Assessment/Plan:       ICD-10-CM ICD-9-CM    1. Attention deficit hyperactivity disorder (ADHD), unspecified ADHD type F90.9 314.01 amphetamine-dextroamphetamine XR (ADDERALL XR) 30 mg XR capsule   2. Iron deficiency anemia, unspecified iron deficiency anemia type D50.9 280.9 CBC WITH AUTOMATED DIFF   3. Acne, unspecified acne type L70.9 706.1 REFERRAL TO DERMATOLOGY   4. Idiopathic urticaria L50.1 708.1 loratadine (CLARITIN) 10 mg tablet   5. BMI 33.0-33.9,adult Z68.33 V85.33      Follow-up Disposition:  Return in about 3 months (around 9/26/2017). Advised her to call back or return to office if symptoms worsen/change/persist.  Discussed expected course/resolution/complications of diagnosis in detail with patient. Medication risks/benefits/costs/interactions/alternatives discussed with patient. She was given an after visit summary which includes diagnoses, current medications, & vitals. She expressed understanding with the diagnosis and plan.

## 2017-06-26 NOTE — LETTER
NOTIFICATION RETURN TO WORK  
 
6/26/2017 9:21 AM 
 
Ms. Fior March [address] [id number] To Whom It May Concern: 
 
Fior March is currently under the care of Sharla Coronel. Please excuse her from work this morning as she had a medical appointment. If there are questions or concerns please have the patient contact our office.  
 
 
 
Sincerely, 
 
 
Amber Mcgovern NP

## 2017-06-26 NOTE — PATIENT INSTRUCTIONS
Weight loss goals:  1. Will creat a food and exercise plan (including meal prep), will make a meal schedule  2. Will join a gym  3. Will get exercise at least 3-5 times a week for 45 min to 1 hour  4. Will introduce more iron rich foods into diet  5. Continue to only drink water, limit creamer and sugar in coffees    Starting a Weight Loss Plan: Care Instructions  Your Care Instructions  If you are thinking about losing weight, it can be hard to know where to start. Your doctor can help you set up a weight loss plan that best meets your needs. You may want to take a class on nutrition or exercise, or join a weight loss support group. If you have questions about how to make changes to your eating or exercise habits, ask your doctor about seeing a registered dietitian or an exercise specialist.  It can be a big challenge to lose weight. But you do not have to make huge changes at once. Make small changes, and stick with them. When those changes become habit, add a few more changes. If you do not think you are ready to make changes right now, try to pick a date in the future. Make an appointment to see your doctor to discuss whether the time is right for you to start a plan. Follow-up care is a key part of your treatment and safety. Be sure to make and go to all appointments, and call your doctor if you are having problems. Its also a good idea to know your test results and keep a list of the medicines you take. How can you care for yourself at home? · Set realistic goals. Many people expect to lose much more weight than is likely. A weight loss of 5% to 10% of your body weight may be enough to improve your health. · Get family and friends involved to provide support. Talk to them about why you are trying to lose weight, and ask them to help. They can help by participating in exercise and having meals with you, even if they may be eating something different. · Find what works best for you.  If you do not have time or do not like to cook, a program that offers meal replacement bars or shakes may be better for you. Or if you like to prepare meals, finding a plan that includes daily menus and recipes may be best.  · Ask your doctor about other health professionals who can help you achieve your weight loss goals. ¨ A dietitian can help you make healthy changes in your diet. ¨ An exercise specialist or  can help you develop a safe and effective exercise program.  ¨ A counselor or psychiatrist can help you cope with issues such as depression, anxiety, or family problems that can make it hard to focus on weight loss. · Consider joining a support group for people who are trying to lose weight. Your doctor can suggest groups in your area. Where can you learn more? Go to http://aparna"[employer]darlene.info/. Enter I830 in the search box to learn more about \"Starting a Weight Loss Plan: Care Instructions. \"  Current as of: October 13, 2016  Content Version: 11.3  © 2202-9625 [employer]. Care instructions adapted under license by [employer] (which disclaims liability or warranty for this information). If you have questions about a medical condition or this instruction, always ask your healthcare professional. [address] [place] [address] any warranty or liability for your use of this information. Learning About the [address] Diet  What is the Mediterranean diet? The Mediterranean diet is a style of eating rather than a diet plan. It features foods eaten in [place], [place], [place] and [place], and other countries along the [facility]. It emphasizes eating foods like fish, fruits, vegetables, beans, high-fiber breads and whole grains, nuts, and olive oil. This style of eating includes limited red meat, cheese, and sweets. Why choose the Mediterranean diet? A Mediterranean-style diet may improve heart health.  It contains more fat than other heart-healthy diets. But the fats are mainly from nuts, unsaturated oils (such as fish oils and olive oil), and certain nut or seed oils (such as canola, soybean, or flaxseed oil). These fats may help protect the heart and blood vessels. How can you get started on the Mediterranean diet? Here are some things you can do to switch to a more Mediterranean way of eating. What to eat  · Eat a variety of fruits and vegetables each day, such as grapes, blueberries, tomatoes, broccoli, peppers, figs, olives, spinach, eggplant, beans, lentils, and chickpeas. · Eat a variety of whole-grain foods each day, such as oats, brown rice, and whole wheat bread, pasta, and couscous. · Eat fish at least 2 times a week. Try tuna, salmon, mackerel, lake trout, herring, or sardines. · Eat moderate amounts of low-fat dairy products, such as milk, cheese, or yogurt. · Eat moderate amounts of poultry and eggs. · Choose healthy (unsaturated) fats, such as nuts, olive oil, and certain nut or seed oils like canola, soybean, and flaxseed. · Limit unhealthy (saturated) fats, such as butter, palm oil, and coconut oil. And limit fats found in animal products, such as meat and dairy products made with whole milk. Try to eat red meat only a few times a month in very small amounts. · Limit sweets and desserts to only a few times a week. This includes sugar-sweetened drinks like soda. The Mediterranean diet may also include red wine with your meal--1 glass each day for women and up to 2 glasses a day for men. Tips for eating at home  · Use herbs, spices, garlic, lemon zest, and citrus juice instead of salt to add flavor to foods. · Add avocado slices to your sandwich instead of barrios. · Have fish for lunch or dinner instead of red meat. Brush the fish with olive oil, and broil or grill it. · Sprinkle your salad with seeds or nuts instead of cheese.   · Cook with olive or canola oil instead of butter or oils that are high in saturated fat.  · Switch from 2% milk or whole milk to 1% or fat-free milk. · Dip raw vegetables in a vinaigrette dressing or hummus instead of dips made from mayonnaise or sour cream.  · Have a piece of fruit for dessert instead of a piece of cake. Try baked apples, or have some dried fruit. Tips for eating out  · Try broiled, grilled, baked, or poached fish instead of having it fried or breaded. · Ask your  to have your meals prepared with olive oil instead of butter. · Order dishes made with marinara sauce or sauces made from olive oil. Avoid sauces made from cream or mayonnaise. · Choose whole-grain breads, whole wheat pasta and pizza crust, brown rice, beans, and lentils. · Cut back on butter or margarine on bread. Instead, you can dip your bread in a small amount of olive oil. · Ask for a side salad or grilled vegetables instead of french fries or chips. Where can you learn more? Go to http://aparna-darlene.info/. Enter 959-668-6688 in the search box to learn more about \"Learning About the Mediterranean Diet. \"  Current as of: December 29, 2016  Content Version: 11.3  © 7029-1348 [employer]. Care instructions adapted under license by [employer] (which disclaims liability or warranty for this information). If you have questions about a medical condition or this instruction, always ask your healthcare professional. [address] [address] any warranty or liability for your use of this information.

## 2017-06-26 NOTE — MR AVS SNAPSHOT
Visit Information Date & Time Provider Department Dept. Phone Encounter #  
 6/26/2017  8:30 AM Aris Anderson NP [facility] Internal Medicine [phone] [id number] Follow-up Instructions Return in about 3 months (around 9/26/2017). Upcoming Health Maintenance Date Due  
 HPV AGE 9Y-34Y (1 of 3 - Female 3 Dose Series) 1/4/2004 DTaP/Tdap/Td series (1 - Tdap) 1/4/2014 PAP AKA CERVICAL CYTOLOGY 1/4/2014 INFLUENZA AGE 9 TO ADULT 8/1/2017 Allergies as of 6/26/2017  Review Complete On: 6/26/2017 By: Aris Anderson NP Severity Noted Reaction Type Reactions Codeine  06/26/2017    Hives Current Immunizations  Reviewed on 3/7/2017 No immunizations on file. Not reviewed this visit You Were Diagnosed With   
  
 Codes Comments Attention deficit hyperactivity disorder (ADHD), unspecified ADHD type    -  Primary ICD-10-CM: F90.9 ICD-9-CM: 314.01 Iron deficiency anemia, unspecified iron deficiency anemia type     ICD-10-CM: D50.9 ICD-9-CM: 280.9 Acne, unspecified acne type     ICD-10-CM: L70.9 ICD-9-CM: 706.1 Idiopathic urticaria     ICD-10-CM: L50.1 ICD-9-CM: 708.1 BMI 33.0-33.9,adult     ICD-10-CM: G25.41 
ICD-9-CM: V85.33 Vitals BP Pulse Temp Resp Height(growth percentile) Weight(growth percentile) 98/66 80 98 °F (36.7 °C) (Oral) 18 5' 5\" (1.651 m) 199 lb (90.3 kg) LMP BMI OB Status Smoking Status 06/24/2017 33.12 kg/m2 IUD Never Smoker Vitals History BMI and BSA Data Body Mass Index Body Surface Area  
 33.12 kg/m 2 2.04 m 2 Preferred Pharmacy Pharmacy Name Phone [id number] [phone] Your Updated Medication List  
  
   
This list is accurate as of: 6/26/17  9:19 AM.  Always use your most recent med list.  
  
  
  
  
 amphetamine-dextroamphetamine XR 30 mg XR capsule Commonly known as:  ADDERALL XR Take 1 Cap (30 mg total) by mouth every morning. Max Daily Amount: 30 mg  
  
 KYLEENA IU  
by IntraUTERine route.  
  
 loratadine 10 mg tablet Commonly known as:  Dolph Seals Take 1 Tab by mouth daily. Indications: URTICARIA Prescriptions Printed Refills  
 amphetamine-dextroamphetamine XR (ADDERALL XR) 30 mg XR capsule 0 Sig: Take 1 Cap (30 mg total) by mouth every morning. Max Daily Amount: 30 mg  
 Class: Print Route: Oral  
  
Prescriptions Sent to Pharmacy Refills  
 loratadine (CLARITIN) 10 mg tablet 0 Sig: Take 1 Tab by mouth daily. Indications: URTICARIA Class: Normal  
 Pharmacy: [address] [place] [address] #: [phone] Route: Oral  
  
We Performed the Following CBC WITH AUTOMATED DIFF [22158 CPT(R)] REFERRAL TO DERMATOLOGY [REF19 Custom] Follow-up Instructions Return in about 3 months (around 9/26/2017). Referral Information Referral ID Referred By Referred To  
  
 [id number] SKIFF, Coralyn [place], [place]   
   [address] [facility] [address] Phone: [phone] Fax: [phone] Visits Status Start Date End Date 1 New Request 6/26/17 6/26/18 If your referral has a status of pending review or denied, additional information will be sent to support the outcome of this decision. Patient Instructions Weight loss goals: 
1. Will creat a food and exercise plan (including meal prep), will make a meal schedule 2. Will join a gym 3. Will get exercise at least 3-5 times a week for 45 min to 1 hour 4. Will introduce more iron rich foods into diet 5. Continue to only drink water, limit creamer and sugar in coffees Starting a Weight Loss Plan: Care Instructions Your Care Instructions If you are thinking about losing weight, it can be hard to know where to start. Your doctor can help you set up a weight loss plan that best meets your needs. You may want to take a class on nutrition or exercise, or join a weight loss support group. If you have questions about how to make changes to your eating or exercise habits, ask your doctor about seeing a registered dietitian or an exercise specialist. 
It can be a big challenge to lose weight. But you do not have to make huge changes at once. Make small changes, and stick with them. When those changes become habit, add a few more changes. If you do not think you are ready to make changes right now, try to pick a date in the future. Make an appointment to see your doctor to discuss whether the time is right for you to start a plan. Follow-up care is a key part of your treatment and safety. Be sure to make and go to all appointments, and call your doctor if you are having problems. Its also a good idea to know your test results and keep a list of the medicines you take. How can you care for yourself at home? · Set realistic goals. Many people expect to lose much more weight than is likely. A weight loss of 5% to 10% of your body weight may be enough to improve your health. · Get family and friends involved to provide support. Talk to them about why you are trying to lose weight, and ask them to help. They can help by participating in exercise and having meals with you, even if they may be eating something different. · Find what works best for you. If you do not have time or do not like to cook, a program that offers meal replacement bars or shakes may be better for you. Or if you like to prepare meals, finding a plan that includes daily menus and recipes may be best. 
· Ask your doctor about other health professionals who can help you achieve your weight loss goals. ¨ A dietitian can help you make healthy changes in your diet.  
¨ An exercise specialist or  can help you develop a safe and effective exercise program. 
¨ A counselor or psychiatrist can help you cope with issues such as depression, anxiety, or family problems that can make it hard to focus on weight loss. · Consider joining a support group for people who are trying to lose weight. Your doctor can suggest groups in your area. Where can you learn more? Go to http://aparna-darlene.info/. Enter T734 in the search box to learn more about \"Starting a Weight Loss Plan: Care Instructions. \" Current as of: October 13, 2016 Content Version: 11.3 © 5845-0143 [employer]. Care instructions adapted under license by [employer] (which disclaims liability or warranty for this information). If you have questions about a medical condition or this instruction, always ask your healthcare professional. [address] any warranty or liability for your use of this information. Learning About the [address] [employer] Diet What is the Mediterranean diet? The Mediterranean diet is a style of eating rather than a diet plan. It features foods eaten in [place], [place], [place] and [place], and other countries along the [facility]. It emphasizes eating foods like fish, fruits, vegetables, beans, high-fiber breads and whole grains, nuts, and olive oil. This style of eating includes limited red meat, cheese, and sweets. Why choose the Mediterranean diet? A Mediterranean-style diet may improve heart health. It contains more fat than other heart-healthy diets. But the fats are mainly from nuts, unsaturated oils (such as fish oils and olive oil), and certain nut or seed oils (such as canola, soybean, or flaxseed oil). These fats may help protect the heart and blood vessels. How can you get started on the Mediterranean diet? Here are some things you can do to switch to a more Mediterranean way of eating. What to eat · Eat a variety of fruits and vegetables each day, such as grapes, blueberries, tomatoes, broccoli, peppers, figs, olives, spinach, eggplant, beans, lentils, and chickpeas. · Eat a variety of whole-grain foods each day, such as oats, brown rice, and whole wheat bread, pasta, and couscous. · Eat fish at least 2 times a week. Try tuna, salmon, mackerel, lake trout, herring, or sardines. · Eat moderate amounts of low-fat dairy products, such as milk, cheese, or yogurt. · Eat moderate amounts of poultry and eggs. · Choose healthy (unsaturated) fats, such as nuts, olive oil, and certain nut or seed oils like canola, soybean, and flaxseed. · Limit unhealthy (saturated) fats, such as butter, palm oil, and coconut oil. And limit fats found in animal products, such as meat and dairy products made with whole milk. Try to eat red meat only a few times a month in very small amounts. · Limit sweets and desserts to only a few times a week. This includes sugar-sweetened drinks like soda. The Mediterranean diet may also include red wine with your meal1 glass each day for women and up to 2 glasses a day for men. Tips for eating at home · Use herbs, spices, garlic, lemon zest, and citrus juice instead of salt to add flavor to foods. · Add avocado slices to your sandwich instead of barrios. · Have fish for lunch or dinner instead of red meat. Brush the fish with olive oil, and broil or grill it. · Sprinkle your salad with seeds or nuts instead of cheese. · Cook with olive or canola oil instead of butter or oils that are high in saturated fat. · Switch from 2% milk or whole milk to 1% or fat-free milk. · Dip raw vegetables in a vinaigrette dressing or hummus instead of dips made from mayonnaise or sour cream. 
· Have a piece of fruit for dessert instead of a piece of cake. Try baked apples, or have some dried fruit. Tips for eating out · Try broiled, grilled, baked, or poached fish instead of having it fried or breaded. · Ask your  to have your meals prepared with olive oil instead of butter. · Order dishes made with marinara sauce or sauces made from olive oil. Avoid sauces made from cream or mayonnaise. · Choose whole-grain breads, whole wheat pasta and pizza crust, brown rice, beans, and lentils. · Cut back on butter or margarine on bread. Instead, you can dip your bread in a small amount of olive oil. · Ask for a side salad or grilled vegetables instead of french fries or chips. Where can you learn more? Go to http://aparnaValidusdarlene.info/. Enter [phone] in the search box to learn more about \"Learning About the Mediterranean Diet. \" Current as of: December 29, 2016 Content Version: 11.3 © 3229-1152 [employer]. Care instructions adapted under license by [employer] (which disclaims liability or warranty for this information). If you have questions about a medical condition or this instruction, always ask your healthcare professional. [place] [address] any warranty or liability for your use of this information. Introducing \[facility]\"" & HEALTH SERVICES! Rolan Figueroa introduces [employer] patient portal. Now you can access parts of your medical record, email your doctor's office, and request medication refills online. 1. In your internet browser, go to [email or web address] 2. Click on the First Time User? Click Here link in the Sign In box. You will see the New Member Sign Up page. 3. Enter your [employer] Access Code exactly as it appears below. You will not need to use this code after youve completed the sign-up process. If you do not sign up before the expiration date, you must request a new code. · [employer] Access Code: [email or web address] Expires: 9/24/2017  9:19 AM 
 
4. Enter the last four digits of your Social Security Number (xxxx) and Date of Birth (mm/dd/yyyy) as indicated and click Submit. You will be taken to the next sign-up page. 5. Create a Scholaroo ID. This will be your Scholaroo login ID and cannot be changed, so think of one that is secure and easy to remember. 6. Create a Scholaroo password. You can change your password at any time. 7. Enter your Password Reset Question and Answer. This can be used at a later time if you forget your password. 8. Enter your e-mail address. You will receive e-mail notification when new information is available in [address] 9. Click Sign Up. You can now view and download portions of your medical record. 10. Click the Download Summary menu link to download a portable copy of your medical information. If you have questions, please visit the Frequently Asked Questions section of the Scholaroo website. Remember, [employer] is NOT to be used for urgent needs. For medical emergencies, dial 911. Now available from your iPhone and Android! Please provide this summary of care documentation to your next provider. Your primary care clinician is listed as Chantal Urbano. If you have any questions after today's visit, please call [phone].

## 2017-06-26 NOTE — PROGRESS NOTES
Chief Complaint   Patient presents with    Physical     1. Have you been to the ER, urgent care clinic since your last visit? Hospitalized since your last visit? No    2. Have you seen or consulted any other health care providers outside of the [address] [place] [address] since your last visit? Include any pap smears or colon screening.   No      Pt request referral to dermatologist to allergist.

## 2017-06-27 LAB
BASOPHILS # BLD AUTO: 0 X10E3/UL (ref 0–0.2)
BASOPHILS NFR BLD AUTO: 0 %
EOSINOPHIL # BLD AUTO: 0.1 X10E3/UL (ref 0–0.4)
EOSINOPHIL NFR BLD AUTO: 2 %
ERYTHROCYTE [DISTWIDTH] IN BLOOD BY AUTOMATED COUNT: 17.1 % (ref 12.3–15.4)
HCT VFR BLD AUTO: 41.9 % (ref 34–46.6)
HGB BLD-MCNC: 13.3 G/DL (ref 11.1–15.9)
IMM GRANULOCYTES # BLD: 0 X10E3/UL (ref 0–0.1)
IMM GRANULOCYTES NFR BLD: 0 %
LYMPHOCYTES # BLD AUTO: 2 X10E3/UL (ref 0.7–3.1)
LYMPHOCYTES NFR BLD AUTO: 36 %
MCH RBC QN AUTO: 26 PG (ref 26.6–33)
MCHC RBC AUTO-ENTMCNC: 31.7 G/DL (ref 31.5–35.7)
MCV RBC AUTO: 82 FL (ref 79–97)
MONOCYTES # BLD AUTO: 0.5 X10E3/UL (ref 0.1–0.9)
MONOCYTES NFR BLD AUTO: 8 %
NEUTROPHILS # BLD AUTO: 3 X10E3/UL (ref 1.4–7)
NEUTROPHILS NFR BLD AUTO: 54 %
PLATELET # BLD AUTO: 254 X10E3/UL (ref 150–379)
RBC # BLD AUTO: 5.12 X10E6/UL (ref 3.77–5.28)
WBC # BLD AUTO: 5.6 X10E3/UL (ref 3.4–10.8)

## 2017-07-25 DIAGNOSIS — F90.9 ATTENTION DEFICIT HYPERACTIVITY DISORDER (ADHD), UNSPECIFIED ADHD TYPE: ICD-10-CM

## 2017-07-26 RX ORDER — DEXTROAMPHETAMINE SACCHARATE, AMPHETAMINE ASPARTATE MONOHYDRATE, DEXTROAMPHETAMINE SULFATE AND AMPHETAMINE SULFATE 7.5; 7.5; 7.5; 7.5 MG/1; MG/1; MG/1; MG/1
30 CAPSULE, EXTENDED RELEASE ORAL
Qty: 30 CAP | Refills: 0 | Status: SHIPPED | OUTPATIENT
Start: 2017-07-26 | End: 2017-08-31 | Stop reason: SDUPTHER

## 2017-08-31 DIAGNOSIS — F90.9 ATTENTION DEFICIT HYPERACTIVITY DISORDER (ADHD), UNSPECIFIED ADHD TYPE: ICD-10-CM

## 2017-08-31 RX ORDER — DEXTROAMPHETAMINE SACCHARATE, AMPHETAMINE ASPARTATE MONOHYDRATE, DEXTROAMPHETAMINE SULFATE AND AMPHETAMINE SULFATE 7.5; 7.5; 7.5; 7.5 MG/1; MG/1; MG/1; MG/1
30 CAPSULE, EXTENDED RELEASE ORAL
Qty: 30 CAP | Refills: 0 | Status: SHIPPED | OUTPATIENT
Start: 2017-08-31 | End: 2018-04-30 | Stop reason: ALTCHOICE

## 2017-10-24 ENCOUNTER — OFFICE VISIT (OUTPATIENT)
Dept: INTERNAL MEDICINE CLINIC | Facility: CLINIC | Age: 22
End: 2017-10-24

## 2017-10-24 VITALS
WEIGHT: 212 LBS | DIASTOLIC BLOOD PRESSURE: 80 MMHG | RESPIRATION RATE: 18 BRPM | BODY MASS INDEX: 35.32 KG/M2 | OXYGEN SATURATION: 99 % | TEMPERATURE: 98.7 F | SYSTOLIC BLOOD PRESSURE: 114 MMHG | HEART RATE: 85 BPM | HEIGHT: 65 IN

## 2017-10-24 DIAGNOSIS — J20.9 ACUTE BRONCHITIS, UNSPECIFIED ORGANISM: Primary | ICD-10-CM

## 2017-10-24 RX ORDER — METHYLPREDNISOLONE 4 MG/1
TABLET ORAL
Qty: 1 DOSE PACK | Refills: 0 | Status: SHIPPED | OUTPATIENT
Start: 2017-10-24 | End: 2018-04-30 | Stop reason: ALTCHOICE

## 2017-10-24 RX ORDER — BENZONATATE 200 MG/1
200 CAPSULE ORAL
Qty: 21 CAP | Refills: 0 | Status: SHIPPED | OUTPATIENT
Start: 2017-10-24 | End: 2017-10-31

## 2017-10-24 NOTE — MR AVS SNAPSHOT
Visit Information Date & Time Provider Department Dept. Phone Encounter #  
 10/24/2017  4:15 PM Kimi Albert, J[place] [address] Internal Medicine [phone] [id number] Follow-up Instructions Return if symptoms worsen or fail to improve, for call if no improvement in 48 hours. Upcoming Health Maintenance Date Due  
 HPV AGE 9Y-34Y (1 of 3 - Female 3 Dose Series) 1/4/2004 DTaP/Tdap/Td series (1 - Tdap) 1/4/2014 PAP AKA CERVICAL CYTOLOGY 1/4/2014 INFLUENZA AGE 9 TO ADULT 8/1/2017 Allergies as of 10/24/2017  Review Complete On: 10/24/2017 By: Kimi Albert NP Severity Noted Reaction Type Reactions Codeine  06/26/2017    Hives Gluten  10/24/2017    Hives Rye Grass Extract  10/24/2017    Hives Current Immunizations  Reviewed on 3/7/2017 No immunizations on file. Not reviewed this visit You Were Diagnosed With   
  
 Codes Comments Acute bronchitis, unspecified organism    -  Primary ICD-10-CM: J20.9 ICD-9-CM: 466.0 Vitals BP Pulse Temp Resp Height(growth percentile) Weight(growth percentile) 114/80 85 98.7 °F (37.1 °C) (Oral) 18 5' 5\" (1.651 m) 212 lb (96.2 kg) LMP SpO2 BMI OB Status Smoking Status 09/25/2017 (Approximate) 99% 35.28 kg/m2 Having regular periods Never Smoker Vitals History BMI and BSA Data Body Mass Index Body Surface Area  
 35.28 kg/m 2 2.1 m 2 Preferred Pharmacy [facility] Phone [phone] Your Updated Medication List  
  
   
This list is accurate as of: 10/24/17  4:41 PM.  Always use your most recent med list.  
  
  
  
  
 amphetamine-dextroamphetamine XR 30 mg XR capsule Commonly known as:  ADDERALL XR Take 1 Cap (30 mg total) by mouth every morningEarliest Fill Date: 8/31/17. Max Daily Amount: 30 mg  
  
 benzonatate 200 mg capsule Commonly known as:  TESSALON  
 Take 1 Cap by mouth three (3) times daily as needed for Cough for up to 7 days. loratadine 10 mg tablet Commonly known as:  Drena Magic Take 1 Tab by mouth daily. Indications: URTICARIA  
  
 methylPREDNISolone 4 mg tablet Commonly known as:  Eri Nelsonpins Follow pack instructions Prescriptions Sent to Pharmacy Refills  
 methylPREDNISolone (MEDROL DOSEPACK) 4 mg tablet 0 Sig: Follow pack instructions Class: Normal  
 [facility]: [phone]  
 benzonatate (TESSALON) 200 mg capsule 0 Sig: Take 1 Cap by mouth three (3) times daily as needed for Cough for up to 7 days. Class: Normal  
 Pharmacy: [address] [place] [address] Ph #: [phone] Route: Oral  
  
Follow-up Instructions Return if symptoms worsen or fail to improve, for call if no improvement in 48 hours. Patient Instructions Bronchitis: Care Instructions Your Care Instructions Bronchitis is inflammation of the bronchial tubes, which carry air to the lungs. The tubes swell and produce mucus, or phlegm. The mucus and inflamed bronchial tubes make you cough. You may have trouble breathing. Most cases of bronchitis are caused by viruses like those that cause colds. Antibiotics usually do not help and they may be harmful. Bronchitis usually develops rapidly and lasts about 2 to 3 weeks in otherwise healthy people. Follow-up care is a key part of your treatment and safety. Be sure to make and go to all appointments, and call your doctor if you are having problems. It's also a good idea to know your test results and keep a list of the medicines you take. How can you care for yourself at home? · Take all medicines exactly as prescribed. Call your doctor if you think you are having a problem with your medicine.  
· Get some extra rest. 
 · Take an over-the-counter pain medicine, such as acetaminophen (Tylenol), ibuprofen (Advil, Motrin), or naproxen (Aleve) to reduce fever and relieve body aches. Read and follow all instructions on the label. · Do not take two or more pain medicines at the same time unless the doctor told you to. Many pain medicines have acetaminophen, which is Tylenol. Too much acetaminophen (Tylenol) can be harmful. · Take an over-the-counter cough medicine that contains dextromethorphan to help quiet a dry, hacking cough so that you can sleep. Avoid cough medicines that have more than one active ingredient. Read and follow all instructions on the label. · Breathe moist air from a humidifier, hot shower, or sink filled with hot water. The heat and moisture will thin mucus so you can cough it out. · Do not smoke. Smoking can make bronchitis worse. If you need help quitting, talk to your doctor about stop-smoking programs and medicines. These can increase your chances of quitting for good. When should you call for help? Call 911 anytime you think you may need emergency care. For example, call if: 
· You have severe trouble breathing. Call your doctor now or seek immediate medical care if: 
· You have new or worse trouble breathing. · You cough up dark brown or bloody mucus (sputum). · You have a new or higher fever. · You have a new rash. Watch closely for changes in your health, and be sure to contact your doctor if: 
· You cough more deeply or more often, especially if you notice more mucus or a change in the color of your mucus. · You are not getting better as expected. Where can you learn more? Go to http://aparna-darlene.info/. Enter H333 in the search box to learn more about \"Bronchitis: Care Instructions. \" Current as of: March 25, 2017 Content Version: 11.3 © 9450-4147 [employer].  Care instructions adapted under license by [address] (which disclaims liability or warranty for this information). If you have questions about a medical condition or this instruction, always ask your healthcare professional. [address] any warranty or liability for your use of this information. Introducing [facility] & HEALTH SERVICES! [facility] introduces [employer] patient portal. Now you can access parts of your medical record, email your doctor's office, and request medication refills online. 1. In your internet browser, go to [email or web address] 2. Click on the First Time User? Click Here link in the Sign In box. You will see the New Member Sign Up page. 3. Enter your [employer] Access Code exactly as it appears below. You will not need to use this code after youve completed the sign-up process. If you do not sign up before the expiration date, you must request a new code. · [employer] Access Code: [email or web address] Expires: 1/22/2018  4:20 PM 
 
4. Enter the last four digits of your Social Security Number (xxxx) and Date of Birth (mm/dd/yyyy) as indicated and click Submit. You will be taken to the next sign-up page. 5. Create a LicenseStream ID. This will be your LicenseStream login ID and cannot be changed, so think of one that is secure and easy to remember. 6. Create a LicenseStream password. You can change your password at any time. 7. Enter your Password Reset Question and Answer. This can be used at a later time if you forget your password. 8. Enter your e-mail address. You will receive e-mail notification when new information is available in [address] 9. Click Sign Up. You can now view and download portions of your medical record. 10. Click the Download Summary menu link to download a portable copy of your medical information. If you have questions, please visit the Frequently Asked Questions section of the [employer] website.  Remember, [employer]Stream is NOT to be used for urgent needs. For medical emergencies, dial 911. Now available from your iPhone and Android! Please provide this summary of care documentation to your next provider. Your primary care clinician is listed as Marly Kohler. If you have any questions after today's visit, please call [phone].

## 2017-10-24 NOTE — PATIENT INSTRUCTIONS
Bronchitis: Care Instructions  Your Care Instructions    Bronchitis is inflammation of the bronchial tubes, which carry air to the lungs. The tubes swell and produce mucus, or phlegm. The mucus and inflamed bronchial tubes make you cough. You may have trouble breathing. Most cases of bronchitis are caused by viruses like those that cause colds. Antibiotics usually do not help and they may be harmful. Bronchitis usually develops rapidly and lasts about 2 to 3 weeks in otherwise healthy people. Follow-up care is a key part of your treatment and safety. Be sure to make and go to all appointments, and call your doctor if you are having problems. It's also a good idea to know your test results and keep a list of the medicines you take. How can you care for yourself at home? · Take all medicines exactly as prescribed. Call your doctor if you think you are having a problem with your medicine. · Get some extra rest.  · Take an over-the-counter pain medicine, such as acetaminophen (Tylenol), ibuprofen (Advil, Motrin), or naproxen (Aleve) to reduce fever and relieve body aches. Read and follow all instructions on the label. · Do not take two or more pain medicines at the same time unless the doctor told you to. Many pain medicines have acetaminophen, which is Tylenol. Too much acetaminophen (Tylenol) can be harmful. · Take an over-the-counter cough medicine that contains dextromethorphan to help quiet a dry, hacking cough so that you can sleep. Avoid cough medicines that have more than one active ingredient. Read and follow all instructions on the label. · Breathe moist air from a humidifier, hot shower, or sink filled with hot water. The heat and moisture will thin mucus so you can cough it out. · Do not smoke. Smoking can make bronchitis worse. If you need help quitting, talk to your doctor about stop-smoking programs and medicines. These can increase your chances of quitting for good.   When should you call for help? Call 911 anytime you think you may need emergency care. For example, call if:  · You have severe trouble breathing. Call your doctor now or seek immediate medical care if:  · You have new or worse trouble breathing. · You cough up dark brown or bloody mucus (sputum). · You have a new or higher fever. · You have a new rash. Watch closely for changes in your health, and be sure to contact your doctor if:  · You cough more deeply or more often, especially if you notice more mucus or a change in the color of your mucus. · You are not getting better as expected. Where can you learn more? Go to http://aparna-darlene.info/. Enter H333 in the search box to learn more about \"Bronchitis: Care Instructions. \"  Current as of: March 25, 2017  Content Version: 11.3  © 2665-0014 [employer]. Care instructions adapted under license by [employer] (which disclaims liability or warranty for this information). If you have questions about a medical condition or this instruction, always ask your healthcare professional. [address] any warranty or liability for your use of this information.

## 2017-10-24 NOTE — PROGRESS NOTES
Subjective:      Uche De Luna is a 23 y.o. female who presents today for cough. Cough: she has had this 2 weeks and states it makes her wake up in the night. Her cough has been stable, no worsening. She has post nasal drip, stuffy congestion. She is also wheezing. She denies ear pain, sinus pain, fevers, nausea, vomiting. She has tried mucinex DM, mounika seltzer, day/nyquil. She states she has sick contacts at work. Mental Health Review  Patient is seen for ADHD. Current treatment regimen includes: Adderall. She has not been taking this medication because it is too expensive. Patient Active Problem List    Diagnosis Date Noted    Idiopathic urticaria 06/26/2017    BMI 33.0-33.9,adult 06/26/2017    ADHD (attention deficit hyperactivity disorder) 03/03/2017     Current Outpatient Prescriptions   Medication Sig Dispense Refill    methylPREDNISolone (MEDROL DOSEPACK) 4 mg tablet Follow pack instructions 1 Dose Pack 0    benzonatate (TESSALON) 200 mg capsule Take 1 Cap by mouth three (3) times daily as needed for Cough for up to 7 days. 21 Cap 0    amphetamine-dextroamphetamine XR (ADDERALL XR) 30 mg XR capsule Take 1 Cap (30 mg total) by mouth every morningEarliest Fill Date: 8/31/17. Max Daily Amount: 30 mg 30 Cap 0    loratadine (CLARITIN) 10 mg tablet Take 1 Tab by mouth daily. Indications: URTICARIA 30 Tab 0     Allergies   Allergen Reactions    Codeine Hives    Gluten Hives    Rye Grass Extract Hives     No past medical history on file.   Family History   Problem Relation Age of Onset    No Known Problems Mother     Arthritis-osteo Father     Cancer Maternal Grandmother      breast    Breast Cancer Maternal Grandmother     Cancer Paternal Grandfather      thyroid cancer- questionable history     Social History   Substance Use Topics    Smoking status: Never Smoker    Smokeless tobacco: Never Used    Alcohol use Yes        Review of Systems    A comprehensive review of systems was negative except for that written in the HPI. Objective:     Visit Vitals    /80    Pulse 85    Temp 98.7 °F (37.1 °C) (Oral)    Resp 18    Ht 5' 5\" (1.651 m)    Wt 212 lb (96.2 kg)    LMP 09/25/2017 (Approximate)    SpO2 99%    BMI 35.28 kg/m2     General appearance: alert, cooperative, no distress, appears stated age  Head: Normocephalic, without obvious abnormality, atraumatic  Eyes: negative  Ears: abnormal TM AD - serous middle ear fluid, abnormal TM AS - serous middle ear fluid  Nose: turbinates edematous, inflamed, no sinus tenderness  Throat: Lips, mucosa, and tongue normal. Teeth and gums normal  Neck: supple, symmetrical, trachea midline, mild anterior cervical adenopathy, thyroid: not enlarged, symmetric, no tenderness/mass/nodules, no carotid bruit and no JVD  Lungs: clear to auscultation bilaterally  Heart: regular rate and rhythm, S1, S2 normal, no murmur, click, rub or gallop  Extremities: extremities normal, atraumatic, no cyanosis or edema  Neurologic: Grossly normal  Nursing note and vitals reviewed  Assessment/Plan:       ICD-10-CM ICD-9-CM    1. Acute bronchitis, unspecified organism J20.9 466.0 methylPREDNISolone (MEDROL DOSEPACK) 4 mg tablet      benzonatate (TESSALON) 200 mg capsule   Will send abx in 48 hours if no improvement. Follow-up Disposition:  Return if symptoms worsen or fail to improve, for call if no improvement in 48 hours. Advised her to call back or return to office if symptoms worsen/change/persist.  Discussed expected course/resolution/complications of diagnosis in detail with patient. Medication risks/benefits/costs/interactions/alternatives discussed with patient. She was given an after visit summary which includes diagnoses, current medications, & vitals. She expressed understanding with the diagnosis and plan.

## 2017-10-24 NOTE — PROGRESS NOTES
Chief Complaint   Patient presents with    Cough     2 weeks     1. Have you been to the ER, urgent care clinic since your last visit? Hospitalized since your last visit? No    2. Have you seen or consulted any other health care providers outside of the [address] [place] [address] since your last visit? Include any pap smears or colon screening.   Yes, Derm

## 2017-10-25 DIAGNOSIS — J40 BRONCHITIS: Primary | ICD-10-CM

## 2017-10-25 RX ORDER — AZITHROMYCIN 250 MG/1
TABLET, FILM COATED ORAL
Qty: 6 TAB | Refills: 0 | Status: SHIPPED | OUTPATIENT
Start: 2017-10-25 | End: 2017-10-30

## 2018-04-30 ENCOUNTER — OFFICE VISIT (OUTPATIENT)
Dept: INTERNAL MEDICINE CLINIC | Facility: CLINIC | Age: 23
End: 2018-04-30

## 2018-04-30 VITALS
DIASTOLIC BLOOD PRESSURE: 69 MMHG | HEART RATE: 77 BPM | WEIGHT: 230 LBS | RESPIRATION RATE: 18 BRPM | BODY MASS INDEX: 38.32 KG/M2 | SYSTOLIC BLOOD PRESSURE: 107 MMHG | HEIGHT: 65 IN | TEMPERATURE: 98.9 F

## 2018-04-30 DIAGNOSIS — Z71.3 DIETARY COUNSELING: ICD-10-CM

## 2018-04-30 DIAGNOSIS — F90.9 ATTENTION DEFICIT HYPERACTIVITY DISORDER (ADHD), UNSPECIFIED ADHD TYPE: ICD-10-CM

## 2018-04-30 DIAGNOSIS — E66.9 OBESITY, CLASS II, BMI 35-39.9: ICD-10-CM

## 2018-04-30 DIAGNOSIS — Z11.3 SCREEN FOR STD (SEXUALLY TRANSMITTED DISEASE): ICD-10-CM

## 2018-04-30 DIAGNOSIS — Z71.82 EXERCISE COUNSELING: ICD-10-CM

## 2018-04-30 DIAGNOSIS — Z00.01 ENCOUNTER FOR GENERAL ADULT MEDICAL EXAMINATION WITH ABNORMAL FINDINGS: Primary | ICD-10-CM

## 2018-04-30 RX ORDER — OMEPRAZOLE 20 MG/1
20 CAPSULE, DELAYED RELEASE ORAL DAILY
COMMUNITY
End: 2019-05-13

## 2018-04-30 RX ORDER — RANITIDINE 150 MG/1
150 TABLET, FILM COATED ORAL 2 TIMES DAILY
COMMUNITY
End: 2019-05-13

## 2018-04-30 RX ORDER — CETIRIZINE HCL 10 MG
TABLET ORAL
COMMUNITY
End: 2019-04-15 | Stop reason: SDUPTHER

## 2018-04-30 RX ORDER — LEVONORGESTREL AND ETHINYL ESTRADIOL 0.1-0.02MG
KIT ORAL
COMMUNITY
Start: 2018-04-13 | End: 2020-07-07

## 2018-04-30 RX ORDER — BIOTIN 1000 MCG
2000 TABLET,CHEWABLE ORAL
COMMUNITY
End: 2019-05-13

## 2018-04-30 NOTE — PROGRESS NOTES
Chief Complaint   Patient presents with    Physical     1. Have you been to the ER, urgent care clinic since your last visit? Hospitalized since your last visit? No    2. Have you seen or consulted any other health care providers outside of the [facility] since your last visit? Include any pap smears or colon screening. Yes When: 2 weeks ago Where: [facility]  Reason for visit: Abdominal pain.

## 2018-04-30 NOTE — PROGRESS NOTES
Subjective:      Mansi Bhatti is a 20 y.o. female who presents today for CPE. She is a pescatarian since for 1-2 months. Health Maintenance  Immunizations:    Influenza: up to date. Tetanus: unknown, record requested. Gardasil: she states she started the series and did not finish it. Cancer screening:    Cervical: reviewed guidelines, UTD, done by OBGYN, records requested. Breast: reviewed guidelines, reviewed SBE with her, UTD, done by OBGYN, records requested     Mental Health Review  Patient is seen for ADHD. Current treatment regimen includes: Adderall XR. Medication compliance: all of the time. Pt reports the following side effects: nothing. She has not gotten this in months due to the high cost of it. She is agreeable to starting a new medication that has better insurance coverage. Body mass index is 38.27 kg/(m^2). She has gained 18lbs since last visit, she states she has lost 5lbs since changing her diet. She is doing crunch for exercise, she is trying to get there 5 days a week. Wt Readings from Last 3 Encounters:   04/30/18 230 lb (104.3 kg)   10/24/17 212 lb (96.2 kg)   06/26/17 199 lb (90.3 kg)         Patient Care Team:  Gina Langston NP as PCP - General (Nurse Practitioner)       The following sections were reviewed & updated as appropriate: PMH, PSH, FH, and SH. Patient Active Problem List   Diagnosis Code    ADHD (attention deficit hyperactivity disorder) F90.9    Idiopathic urticaria L50.1    BMI 33.0-33.9,adult Z68.33          Prior to Admission medications    Medication Sig Start Date End Date Taking? Authorizing Provider   cetirizine (ZYRTEC) 10 mg tablet Take  by mouth. Yes Historical Provider   LARISSIA 0.1-20 mg-mcg tab  4/13/18  Yes Historical Provider   biotin 1,000 mcg chew Take 2,000 mg by mouth. Yes Historical Provider   raNITIdine (ZANTAC) 150 mg tablet Take 150 mg by mouth two (2) times a day.    Yes Historical Provider   omeprazole (PRILOSEC) 20 mg capsule Take 20 mg by mouth daily. Yes Historical Provider   loratadine (CLARITIN) 10 mg tablet Take 1 Tab by mouth daily. Indications: URTICARIA 6/26/17  Yes Micah Pierce NP   methylPREDNISolone (MEDROL DOSEPACK) 4 mg tablet Follow pack instructions 10/24/17   Micah Pierce NP   amphetamine-dextroamphetamine XR (ADDERALL XR) 30 mg XR capsule Take 1 Cap (30 mg total) by mouth every morningEarliest Fill Date: 8/31/17. Max Daily Amount: 30 mg 8/31/17   Micah Pierce NP          Allergies   Allergen Reactions    Codeine Hives    Gluten Hives    Rye Grass Extract Hives        Social History   Substance Use Topics    Smoking status: Never Smoker    Smokeless tobacco: Never Used    Alcohol use Yes        Review of Systems    A comprehensive review of systems was negative except for that written in the HPI. Objective:     Visit Vitals    /69    Pulse 77    Temp 98.9 °F (37.2 °C) (Oral)    Resp 18    Ht 5' 5\" (1.651 m)    Wt 230 lb (104.3 kg)    LMP 04/25/2018 (Exact Date)    BMI 38.27 kg/m2     General:  Alert, cooperative, no distress, appears stated age, overweight   Head:  Normocephalic, without obvious abnormality, atraumatic. Eyes:  Conjunctivae/corneas clear. PERRL, EOMs intact. Ears:  TMs with serious fluid, normal external ear canals both ears   Nose: Nares normal. Septum midline. Mucosa normal. No drainage or sinus tenderness. Throat: Lips, mucosa, and tongue normal. Teeth and gums normal.   Neck: Supple, symmetrical, trachea midline, no adenopathy, thyroid: no enlargement/tenderness/nodules, no carotid bruit and no JVD. Back:   Symmetric, no curvature. ROM normal. No CVA tenderness. Lungs:   Clear to auscultation bilaterally. Chest wall:  No tenderness or deformity. Heart:  Regular rate and rhythm, S1, S2 normal, no murmur, click, rub or gallop. Abdomen:   Soft, non-tender. Bowel sounds normal. No masses,  No organomegaly.    Extremities: Extremities normal, atraumatic, no cyanosis or edema. Pulses: 2+ and symmetric all extremities. Skin: Skin color, texture, turgor normal. No rashes or lesions. Lymph nodes: Cervical, supraclavicular, and axillary nodes normal.   Neurologic: CNII-XII intact. Normal strength, sensation and reflexes throughout. Nursing note and vitals reviewed  Assessment/Plan:       ICD-10-CM ICD-9-CM    1. Encounter for general adult medical examination with abnormal findings Z00.01 V70.0 CBC WITH AUTOMATED DIFF      LIPID PANEL      METABOLIC PANEL, COMPREHENSIVE   2. Screen for STD (sexually transmitted disease) Z11.3 V74.5 HIV 1/2 AG/AB, 4TH GENERATION,W RFLX CONFIRM      RPR W/REFLEX TITER AND TREPONEMA ABS      CT/NG/T.VAGINALIS AMPLIFICATION   3. Dietary counseling Z71.3 V65.3    4. Exercise counseling Z71.82 V65.41    5. Obesity, Class II, BMI 35-39.9 E66.9 278.00    Adderall XR discontinued, mydayis ordered. She will call previous PCP to get immunization history  Follow-up Disposition:  Return for As needed. Advised her to call back or return to office if symptoms worsen/change/persist.  Discussed expected course/resolution/complications of diagnosis in detail with patient. Medication risks/benefits/costs/interactions/alternatives discussed with patient. She was given an after visit summary which includes diagnoses, current medications, & vitals. She expressed understanding with the diagnosis and plan. Discussed the patient's BMI with her. The BMI follow up plan is as follows:     dietary management education, guidance, and counseling  encourage exercise  monitor weight  prescribed dietary intake    An After Visit Summary was printed and given to the patient.

## 2018-04-30 NOTE — PATIENT INSTRUCTIONS
Starting a Weight Loss Plan: Care Instructions  Your Care Instructions    If you are thinking about losing weight, it can be hard to know where to start. Your doctor can help you set up a weight loss plan that best meets your needs. You may want to take a class on nutrition or exercise, or join a weight loss support group. If you have questions about how to make changes to your eating or exercise habits, ask your doctor about seeing a registered dietitian or an exercise specialist.  It can be a big challenge to lose weight. But you do not have to make huge changes at once. Make small changes, and stick with them. When those changes become habit, add a few more changes. If you do not think you are ready to make changes right now, try to pick a date in the future. Make an appointment to see your doctor to discuss whether the time is right for you to start a plan. Follow-up care is a key part of your treatment and safety. Be sure to make and go to all appointments, and call your doctor if you are having problems. It's also a good idea to know your test results and keep a list of the medicines you take. How can you care for yourself at home? · Set realistic goals. Many people expect to lose much more weight than is likely. A weight loss of 5% to 10% of your body weight may be enough to improve your health. · Get family and friends involved to provide support. Talk to them about why you are trying to lose weight, and ask them to help. They can help by participating in exercise and having meals with you, even if they may be eating something different. · Find what works best for you. If you do not have time or do not like to cook, a program that offers meal replacement bars or shakes may be better for you. Or if you like to prepare meals, finding a plan that includes daily menus and recipes may be best.  · Ask your doctor about other health professionals who can help you achieve your weight loss goals.   ¨ A dietitian can help you make healthy changes in your diet. ¨ An exercise specialist or  can help you develop a safe and effective exercise program.  ¨ A counselor or psychiatrist can help you cope with issues such as depression, anxiety, or family problems that can make it hard to focus on weight loss. · Consider joining a support group for people who are trying to lose weight. Your doctor can suggest groups in your area. Where can you learn more? Go to http://aparna-darlene.info/. Enter K067 in the search box to learn more about \"Starting a Weight Loss Plan: Care Instructions. \"  Current as of: October 13, 2016  Content Version: 11.4  © 8790-9318 [employer]. Care instructions adapted under license by [employer] (which disclaims liability or warranty for this information). If you have questions about a medical condition or this instruction, always ask your healthcare professional. [address] any warranty or liability for your use of this information. Body Mass Index: Care Instructions  Your Care Instructions    Body mass index (BMI) can help you see if your weight is raising your risk for health problems. It uses a formula to compare how much you weigh with how tall you are. · A BMI lower than 18.5 is considered underweight. · A BMI between 18.5 and 24.9 is considered healthy. · A BMI between 25 and 29.9 is considered overweight. A BMI of 30 or higher is considered obese. If your BMI is in the normal range, it means that you have a lower risk for weight-related health problems. If your BMI is in the overweight or obese range, you may be at increased risk for weight-related health problems, such as high blood pressure, heart disease, stroke, arthritis or joint pain, and diabetes.  If your BMI is in the underweight range, you may be at increased risk for health problems such as fatigue, lower protection (immunity) against illness, muscle loss, bone loss, hair loss, and hormone problems. BMI is just one measure of your risk for weight-related health problems. You may be at higher risk for health problems if you are not active, you eat an unhealthy diet, or you drink too much alcohol or use tobacco products. Follow-up care is a key part of your treatment and safety. Be sure to make and go to all appointments, and call your doctor if you are having problems. It's also a good idea to know your test results and keep a list of the medicines you take. How can you care for yourself at home? · Practice healthy eating habits. This includes eating plenty of fruits, vegetables, whole grains, lean protein, and low-fat dairy. · If your doctor recommends it, get more exercise. Walking is a good choice. Bit by bit, increase the amount you walk every day. Try for at least 30 minutes on most days of the week. · Do not smoke. Smoking can increase your risk for health problems. If you need help quitting, talk to your doctor about stop-smoking programs and medicines. These can increase your chances of quitting for good. · Limit alcohol to 2 drinks a day for men and 1 drink a day for women. Too much alcohol can cause health problems. If you have a BMI higher than 25  · Your doctor may do other tests to check your risk for weight-related health problems. This may include measuring the distance around your waist. A waist measurement of more than 40 inches in men or 35 inches in women can increase the risk of weight-related health problems. · Talk with your doctor about steps you can take to stay healthy or improve your health. You may need to make lifestyle changes to lose weight and stay healthy, such as changing your diet and getting regular exercise. If you have a BMI lower than 18.5  · Your doctor may do other tests to check your risk for health problems.   · Talk with your doctor about steps you can take to stay healthy or improve your health. You may need to make lifestyle changes to gain or maintain weight and stay healthy, such as getting more healthy foods in your diet and doing exercises to build muscle. Where can you learn more? Go to http://aparna-darlene.info/. Enter S176 in the search box to learn more about \"Body Mass Index: Care Instructions. \"  Current as of: October 13, 2016  Content Version: 11.4  © 6236-1505 [employer]. Care instructions adapted under license by [employer] (which disclaims liability or warranty for this information). If you have questions about a medical condition or this instruction, always ask your healthcare professional. [address] any warranty or liability for your use of this information.

## 2018-04-30 NOTE — MR AVS SNAPSHOT
[address] [place] [address] 
[phone] Patient: Fadia Brown MRN: [id number] YFS:1/0/2000 Visit Information Date & Time Provider Department Dept. Phone Encounter #  
 4/30/2018  8:30 AM Chantale Jung NP [facility] Internal Medicine [phone] [id number] Follow-up Instructions Return for As needed. Upcoming Health Maintenance Date Due  
 HPV Age 9Y-34Y (1 of 1 - Female 3 Dose Series) 1/4/2004 DTaP/Tdap/Td series (1 - Tdap) 1/4/2014 PAP AKA CERVICAL CYTOLOGY 1/4/2014 Influenza Age 5 to Adult 8/1/2018 Allergies as of 4/30/2018  Review Complete On: 4/30/2018 By: Chantale Jung NP Severity Noted Reaction Type Reactions Codeine  06/26/2017    Hives Gluten  10/24/2017    Hives Rye Grass Extract  10/24/2017    Hives Current Immunizations  Reviewed on 3/7/2017 No immunizations on file. Not reviewed this visit You Were Diagnosed With   
  
 Codes Comments Encounter for general adult medical examination with abnormal findings    -  Primary ICD-10-CM: Z00.01 
ICD-9-CM: V70.0 Screen for STD (sexually transmitted disease)     ICD-10-CM: Z11.3 ICD-9-CM: V74.5 Dietary counseling     ICD-10-CM: Z71.3 ICD-9-CM: V65.3 Exercise counseling     ICD-10-CM: Z71.82 
ICD-9-CM: V65.41 Obesity, Class II, BMI 35-39.9     ICD-10-CM: E66.9 ICD-9-CM: 278.00 Attention deficit hyperactivity disorder (ADHD), unspecified ADHD type     ICD-10-CM: F90.9 ICD-9-CM: 314.01 Vitals BP Pulse Temp Resp Height(growth percentile) Weight(growth percentile) 107/69 77 98.9 °F (37.2 °C) (Oral) 18 5' 5\" (1.651 m) 230 lb (104.3 kg) LMP BMI OB Status Smoking Status 04/25/2018 (Exact Date) 38.27 kg/m2 Having regular periods Never Smoker Vitals History BMI and BSA Data  Body Mass Index Body Surface Area  
 38.27 kg/m 2 2.19 m 2  
  
  
 Preferred Pharmacy Pharmacy Name Phone [id number] [phone] Your Updated Medication List  
  
   
This list is accurate as of 4/30/18  9:08 AM.  Always use your most recent med list.  
  
  
  
  
 biotin 1,000 mcg [employer] Take 2,000 mg by mouth. dextroamphetamine-amphetamine 12.5 mg Ct24 Commonly known as:  MYDAYIS Take 1 Tab by mouth daily. Max Daily Amount: 1 Tab. LARISSIA 0.1-20 mg-mcg Tab Generic drug:  levonorgestrel-ethinyl estradiol  
  
 loratadine 10 mg tablet Commonly known as:  Dorota Muss Take 1 Tab by mouth daily. Indications: URTICARIA PriLOSEC 20 mg capsule Generic drug:  omeprazole Take 20 mg by mouth daily. ZANTAC 150 mg tablet Generic drug:  raNITIdine Take 150 mg by mouth two (2) times a day. ZyrTEC 10 mg tablet Generic drug:  cetirizine Take  by mouth. Prescriptions Printed Refills  
 dextroamphetamine-amphetamine (MYDAYIS) 12.5 mg CT24 0 Sig: Take 1 Tab by mouth daily. Max Daily Amount: 1 Tab. Class: Print Route: Oral  
  
We Performed the Following CBC WITH AUTOMATED DIFF [80014 CPT(R)] CT/NG/T.VAGINALIS AMPLIFICATION [id number] CPT(R)] HIV 1/2 AG/AB, 4TH GENERATION,W RFLX CONFIRM [id number] CPT(R)] LIPID PANEL [85266 CPT(R)] METABOLIC PANEL, COMPREHENSIVE [51446 CPT(R)] RPR W/REFLEX TITER AND TREPONEMA ABS [[id number] Custom] Follow-up Instructions Return for As needed. Patient Instructions Starting a Weight Loss Plan: Care Instructions Your Care Instructions If you are thinking about losing weight, it can be hard to know where to start. Your doctor can help you set up a weight loss plan that best meets your needs. You may want to take a class on nutrition or exercise, or join a weight loss support group.  If you have questions about how to make changes to your eating or exercise habits, ask your doctor about seeing a registered dietitian or an exercise specialist. 
It can be a big challenge to lose weight. But you do not have to make huge changes at once. Make small changes, and stick with them. When those changes become habit, add a few more changes. If you do not think you are ready to make changes right now, try to pick a date in the future. Make an appointment to see your doctor to discuss whether the time is right for you to start a plan. Follow-up care is a key part of your treatment and safety. Be sure to make and go to all appointments, and call your doctor if you are having problems. It's also a good idea to know your test results and keep a list of the medicines you take. How can you care for yourself at home? · Set realistic goals. Many people expect to lose much more weight than is likely. A weight loss of 5% to 10% of your body weight may be enough to improve your health. · Get family and friends involved to provide support. Talk to them about why you are trying to lose weight, and ask them to help. They can help by participating in exercise and having meals with you, even if they may be eating something different. · Find what works best for you. If you do not have time or do not like to cook, a program that offers meal replacement bars or shakes may be better for you. Or if you like to prepare meals, finding a plan that includes daily menus and recipes may be best. 
· Ask your doctor about other health professionals who can help you achieve your weight loss goals. ¨ A dietitian can help you make healthy changes in your diet. ¨ An exercise specialist or  can help you develop a safe and effective exercise program. 
¨ A counselor or psychiatrist can help you cope with issues such as depression, anxiety, or family problems that can make it hard to focus on weight loss. · Consider joining a support group for people who are trying to lose weight. Your doctor can suggest groups in your area. Where can you learn more? Go to http://aparna-darlene.info/. Enter E020 in the search box to learn more about \"Starting a Weight Loss Plan: Care Instructions. \" Current as of: October 13, 2016 Content Version: 11.4 © 0698-9720 [employer]. Care instructions adapted under license by [employer] (which disclaims liability or warranty for this information). If you have questions about a medical condition or this instruction, always ask your healthcare professional. [address] any warranty or liability for your use of this information. Body Mass Index: Care Instructions Your Care Instructions Body mass index (BMI) can help you see if your weight is raising your risk for health problems. It uses a formula to compare how much you weigh with how tall you are. · A BMI lower than 18.5 is considered underweight. · A BMI between 18.5 and 24.9 is considered healthy. · A BMI between 25 and 29.9 is considered overweight. A BMI of 30 or higher is considered obese. If your BMI is in the normal range, it means that you have a lower risk for weight-related health problems. If your BMI is in the overweight or obese range, you may be at increased risk for weight-related health problems, such as high blood pressure, heart disease, stroke, arthritis or joint pain, and diabetes. If your BMI is in the underweight range, you may be at increased risk for health problems such as fatigue, lower protection (immunity) against illness, muscle loss, bone loss, hair loss, and hormone problems. BMI is just one measure of your risk for weight-related health problems. You may be at higher risk for health problems if you are not active, you eat an unhealthy diet, or you drink too much alcohol or use tobacco products. Follow-up care is a key part of your treatment and safety. Be sure to make and go to all appointments, and call your doctor if you are having problems. It's also a good idea to know your test results and keep a list of the medicines you take. How can you care for yourself at home? · Practice healthy eating habits. This includes eating plenty of fruits, vegetables, whole grains, lean protein, and low-fat dairy. · If your doctor recommends it, get more exercise. Walking is a good choice. Bit by bit, increase the amount you walk every day. Try for at least 30 minutes on most days of the week. · Do not smoke. Smoking can increase your risk for health problems. If you need help quitting, talk to your doctor about stop-smoking programs and medicines. These can increase your chances of quitting for good. · Limit alcohol to 2 drinks a day for men and 1 drink a day for women. Too much alcohol can cause health problems. If you have a BMI higher than 25 · Your doctor may do other tests to check your risk for weight-related health problems. This may include measuring the distance around your waist. A waist measurement of more than 40 inches in men or 35 inches in women can increase the risk of weight-related health problems. · Talk with your doctor about steps you can take to stay healthy or improve your health. You may need to make lifestyle changes to lose weight and stay healthy, such as changing your diet and getting regular exercise. If you have a BMI lower than 18.5 · Your doctor may do other tests to check your risk for health problems. · Talk with your doctor about steps you can take to stay healthy or improve your health. You may need to make lifestyle changes to gain or maintain weight and stay healthy, such as getting more healthy foods in your diet and doing exercises to build muscle. Where can you learn more? Go to http://aparna-darlene.info/. Enter S176 in the search box to learn more about \"Body Mass Index: Care Instructions. \" Current as of: October 13, 2016 Content Version: 11.4 © 0314-9134 [employer]wise, [employer]. Care instructions adapted under license by [employer] (which disclaims liability or warranty for this information). If you have questions about a medical condition or this instruction, always ask your healthcare professional. [address] any warranty or liability for your use of this information. Introducing [facility]! [facility] introduces Life With Linda patient portal. Now you can access parts of your medical record, email your doctor's office, and request medication refills online. 1. In your internet browser, go to [email or web address] 2. Click on the First Time User? Click Here link in the Sign In box. You will see the New Member Sign Up page. 3. Enter your Life With Linda Access Code exactly as it appears below. You will not need to use this code after youve completed the sign-up process. If you do not sign up before the expiration date, you must request a new code. · Life With Linda Access Code: [email or web address] Expires: 7/29/2018  9:08 AM 
 
4. Enter the last four digits of your Social Security Number (xxxx) and Date of Birth (mm/dd/yyyy) as indicated and click Submit. You will be taken to the next sign-up page. 5. Create a Life With Linda ID. This will be your Life With Linda login ID and cannot be changed, so think of one that is secure and easy to remember. 6. Create a Life With Linda password. You can change your password at any time. 7. Enter your Password Reset Question and Answer. This can be used at a later time if you forget your password. 8. Enter your e-mail address. You will receive e-mail notification when new information is available in [address] 9. Click Sign Up. You can now view and download portions of your medical record. 10. Click the Download Summary menu link to download a portable copy of your medical information. If you have questions, please visit the Frequently Asked Questions section of the Parents Journey website. Remember, Parents Journey is NOT to be used for urgent needs. For medical emergencies, dial 911. Now available from your iPhone and Android! Please provide this summary of care documentation to your next provider. Your primary care clinician is listed as Taco Kenny. If you have any questions after today's visit, please call [phone].

## 2018-05-01 ENCOUNTER — CLINICAL SUPPORT (OUTPATIENT)
Dept: INTERNAL MEDICINE CLINIC | Facility: CLINIC | Age: 23
End: 2018-05-01

## 2018-05-01 DIAGNOSIS — Z23 ENCOUNTER FOR IMMUNIZATION: Primary | ICD-10-CM

## 2018-05-01 NOTE — PROGRESS NOTES
Mansi Bhatti  is a 20 y.o.  female  who present for TDAP immunizations/injections. He/she denies any symptoms , reactions or allergies that would exclude them from being immunized today. Risks and adverse reactions were discussed and the VIS was given if applicable to them. All questions were addressed. He/She was observed for 5 min post injection. There were no reactions observed.     Wai Singer LPN

## 2018-05-02 LAB
ALBUMIN SERPL-MCNC: 4.1 G/DL (ref 3.5–5.5)
ALBUMIN/GLOB SERPL: 1.5 {RATIO} (ref 1.2–2.2)
ALP SERPL-CCNC: 50 IU/L (ref 39–117)
ALT SERPL-CCNC: 12 IU/L (ref 0–32)
AST SERPL-CCNC: 16 IU/L (ref 0–40)
BASOPHILS # BLD AUTO: 0 X10E3/UL (ref 0–0.2)
BASOPHILS NFR BLD AUTO: 0 %
BILIRUB SERPL-MCNC: <0.2 MG/DL (ref 0–1.2)
BUN SERPL-MCNC: 7 MG/DL (ref 6–20)
BUN/CREAT SERPL: 10 (ref 9–23)
C TRACH RRNA SPEC QL NAA+PROBE: NEGATIVE
CALCIUM SERPL-MCNC: 9 MG/DL (ref 8.7–10.2)
CHLORIDE SERPL-SCNC: 108 MMOL/L (ref 96–106)
CHOLEST SERPL-MCNC: 130 MG/DL (ref 100–199)
CO2 SERPL-SCNC: 17 MMOL/L (ref 18–29)
CREAT SERPL-MCNC: 0.67 MG/DL (ref 0.57–1)
EOSINOPHIL # BLD AUTO: 0.1 X10E3/UL (ref 0–0.4)
EOSINOPHIL NFR BLD AUTO: 1 %
ERYTHROCYTE [DISTWIDTH] IN BLOOD BY AUTOMATED COUNT: 15.5 % (ref 12.3–15.4)
GFR SERPLBLD CREATININE-BSD FMLA CKD-EPI: 123 ML/MIN/1.73
GFR SERPLBLD CREATININE-BSD FMLA CKD-EPI: 141 ML/MIN/1.73
GLOBULIN SER CALC-MCNC: 2.7 G/DL (ref 1.5–4.5)
GLUCOSE SERPL-MCNC: 83 MG/DL (ref 65–99)
HCT VFR BLD AUTO: 40.5 % (ref 34–46.6)
HDLC SERPL-MCNC: 49 MG/DL
HGB BLD-MCNC: 13.3 G/DL (ref 11.1–15.9)
HIV 1+2 AB+HIV1 P24 AG SERPL QL IA: NON REACTIVE
IMM GRANULOCYTES # BLD: 0 X10E3/UL (ref 0–0.1)
IMM GRANULOCYTES NFR BLD: 0 %
LDLC SERPL CALC-MCNC: 69 MG/DL (ref 0–99)
LYMPHOCYTES # BLD AUTO: 1.5 X10E3/UL (ref 0.7–3.1)
LYMPHOCYTES NFR BLD AUTO: 31 %
MCH RBC QN AUTO: 26.7 PG (ref 26.6–33)
MCHC RBC AUTO-ENTMCNC: 32.8 G/DL (ref 31.5–35.7)
MCV RBC AUTO: 81 FL (ref 79–97)
MONOCYTES # BLD AUTO: 0.5 X10E3/UL (ref 0.1–0.9)
MONOCYTES NFR BLD AUTO: 9 %
N GONORRHOEA RRNA SPEC QL NAA+PROBE: NEGATIVE
NEUTROPHILS # BLD AUTO: 2.9 X10E3/UL (ref 1.4–7)
NEUTROPHILS NFR BLD AUTO: 59 %
PLATELET # BLD AUTO: 265 X10E3/UL (ref 150–379)
POTASSIUM SERPL-SCNC: 4 MMOL/L (ref 3.5–5.2)
PROT SERPL-MCNC: 6.8 G/DL (ref 6–8.5)
RBC # BLD AUTO: 4.99 X10E6/UL (ref 3.77–5.28)
RPR SER QL: NON REACTIVE
SODIUM SERPL-SCNC: 144 MMOL/L (ref 134–144)
T VAGINALIS RRNA SPEC QL NAA+PROBE: NEGATIVE
TRIGL SERPL-MCNC: 58 MG/DL (ref 0–149)
VLDLC SERPL CALC-MCNC: 12 MG/DL (ref 5–40)
WBC # BLD AUTO: 4.9 X10E3/UL (ref 3.4–10.8)

## 2018-05-02 NOTE — PROGRESS NOTES
Slight elevation in RDW, chloride slightly elevated and CO2 slightly low.  Everything else is normal.

## 2018-05-14 DIAGNOSIS — F90.9 ATTENTION DEFICIT HYPERACTIVITY DISORDER (ADHD), UNSPECIFIED ADHD TYPE: Primary | ICD-10-CM

## 2018-06-13 DIAGNOSIS — F90.9 ATTENTION DEFICIT HYPERACTIVITY DISORDER (ADHD), UNSPECIFIED ADHD TYPE: ICD-10-CM

## 2018-06-13 NOTE — TELEPHONE ENCOUNTER
Patient was notified by her insurance company that her refill would require a prior authorization-a letter was sent to the patient's home. Patient asked if this can be taken care of before she's completely out of medication.

## 2018-07-23 DIAGNOSIS — F90.9 ATTENTION DEFICIT HYPERACTIVITY DISORDER (ADHD), UNSPECIFIED ADHD TYPE: ICD-10-CM

## 2018-09-06 ENCOUNTER — OFFICE VISIT (OUTPATIENT)
Dept: INTERNAL MEDICINE CLINIC | Facility: CLINIC | Age: 23
End: 2018-09-06

## 2018-09-06 VITALS
SYSTOLIC BLOOD PRESSURE: 95 MMHG | OXYGEN SATURATION: 98 % | WEIGHT: 213 LBS | DIASTOLIC BLOOD PRESSURE: 67 MMHG | TEMPERATURE: 99 F | RESPIRATION RATE: 18 BRPM | HEIGHT: 65 IN | BODY MASS INDEX: 35.49 KG/M2 | HEART RATE: 95 BPM

## 2018-09-06 DIAGNOSIS — F90.9 ATTENTION DEFICIT HYPERACTIVITY DISORDER (ADHD), UNSPECIFIED ADHD TYPE: Primary | ICD-10-CM

## 2018-09-06 NOTE — PROGRESS NOTES
Chief Complaint Patient presents with  Medication Evaluation Nabil Menezes 1. Have you been to the ER, urgent care clinic since your last visit? Hospitalized since your last visit? No 
 
2. Have you seen or consulted any other health care providers outside of the [address] [place] [address] since your last visit? Include any pap smears or colon screening.  No

## 2018-09-11 ENCOUNTER — DOCUMENTATION ONLY (OUTPATIENT)
Dept: INTERNAL MEDICINE CLINIC | Facility: CLINIC | Age: 23
End: 2018-09-11

## 2018-09-11 NOTE — PROGRESS NOTES
Prior auth initiated for Mydayis due to patient's insurance plan change. ([phone]). ID # [id number].  Patient has to try 3 medications prior to [place]

## 2018-09-11 NOTE — PROGRESS NOTES
Patient called back and was insistent/rude in regards to prior auth being obtained for her medication. Advised patient that due to a plan change in her insurance(which we were not made aware of),  Prior Kerline Righter was initiated and pending pharmacy review with the insurance. Wait for approval/disapproval will be 24-72 hours. Medication trial of at least three medications is required as per pre-authorization personnel.

## 2018-09-13 ENCOUNTER — DOCUMENTATION ONLY (OUTPATIENT)
Dept: INTERNAL MEDICINE CLINIC | Facility: CLINIC | Age: 23
End: 2018-09-13

## 2018-09-13 NOTE — PROGRESS NOTES
Insurance forms completed and faxed to insurance for medical exception. Original copies mailed to patient's address on file. Copies to be scanned.

## 2018-09-13 NOTE — PROGRESS NOTES
Medical exception forms received. Filled out and placed with provider. A Mydayis coupon was called into [facility] pharmacy to be able to give patient a fill x 3 for $3. Concurrent refills for $15. Patient called and made aware that coupon was given to pharmacy. Patient to return to pharmacy with prescription . Patient verbalized understanding.

## 2018-09-18 DIAGNOSIS — F90.9 ATTENTION DEFICIT HYPERACTIVITY DISORDER (ADHD), UNSPECIFIED ADHD TYPE: Primary | ICD-10-CM

## 2018-10-31 DIAGNOSIS — F90.9 ATTENTION DEFICIT HYPERACTIVITY DISORDER (ADHD), UNSPECIFIED ADHD TYPE: ICD-10-CM

## 2018-11-07 ENCOUNTER — OFFICE VISIT (OUTPATIENT)
Dept: INTERNAL MEDICINE CLINIC | Facility: CLINIC | Age: 23
End: 2018-11-07

## 2018-11-07 VITALS
HEIGHT: 65 IN | WEIGHT: 214 LBS | HEART RATE: 105 BPM | TEMPERATURE: 98.2 F | BODY MASS INDEX: 35.65 KG/M2 | RESPIRATION RATE: 18 BRPM | DIASTOLIC BLOOD PRESSURE: 58 MMHG | SYSTOLIC BLOOD PRESSURE: 83 MMHG | OXYGEN SATURATION: 99 %

## 2018-11-07 DIAGNOSIS — T78.40XA ALLERGIC REACTION, INITIAL ENCOUNTER: Primary | ICD-10-CM

## 2018-11-07 DIAGNOSIS — F41.9 ANXIETY: ICD-10-CM

## 2018-11-07 PROBLEM — E66.01 SEVERE OBESITY (HCC): Status: ACTIVE | Noted: 2018-11-07

## 2018-11-07 RX ORDER — HYDROXYZINE 25 MG/1
25 TABLET, FILM COATED ORAL
Refills: 0 | COMMUNITY
Start: 2018-09-26 | End: 2020-05-07

## 2018-11-07 NOTE — PROGRESS NOTES
Current Outpatient Medications:     lisdexamfetamine (VYVANSE) 20 mg capsule, Take 1 Cap (20 mg total) by mouth dailyEarliest Fill Date: 11/2/18. Max Daily Amount: 20 mg, Disp: 30 Cap, Rfl: 0    cetirizine (ZYRTEC) 10 mg tablet, Take  by mouth., Disp: , Rfl:     LARISSIA 0.1-20 mg-mcg tab, , Disp: , Rfl:     biotin 1,000 mcg chew, Take 2,000 mg by mouth., Disp: , Rfl:     raNITIdine (ZANTAC) 150 mg tablet, Take 150 mg by mouth two (2) times a day., Disp: , Rfl:     omeprazole (PRILOSEC) 20 mg capsule, Take 20 mg by mouth daily. , Disp: , Rfl:     loratadine (CLARITIN) 10 mg tablet, Take 1 Tab by mouth daily.  Indications: URTICARIA, Disp: 30 Tab, Rfl: 0    hydrOXYzine HCl (ATARAX) 25 mg tablet, 25 mg., Disp: , Rfl: 0

## 2018-11-07 NOTE — PROGRESS NOTES
Subjective:      Dang Jones is a 20 y.o. female who presents today for allergic reaction. Allergic reaction: she walked into [facility] without appointment for urgent issue of facial swelling, SOB, facial pain. She states she was seen at [facility] a month ago for the same thing but less severe. She was given hydroxyzine from the ED and states this is not helping. She does not know trigger. Through the interview with the patient she became anxious with hyperventilation, stating that her throat was closing and that she couldn't breathe. Patient Active Problem List    Diagnosis Date Noted    Obesity, Class II, BMI 35-39.9 04/30/2018    Idiopathic urticaria 06/26/2017    ADHD (attention deficit hyperactivity disorder) 03/03/2017     Current Outpatient Medications   Medication Sig Dispense Refill    lisdexamfetamine (VYVANSE) 20 mg capsule Take 1 Cap (20 mg total) by mouth dailyEarliest Fill Date: 11/2/18. Max Daily Amount: 20 mg 30 Cap 0    cetirizine (ZYRTEC) 10 mg tablet Take  by mouth.  LARISSIA 0.1-20 mg-mcg tab       biotin 1,000 mcg chew Take 2,000 mg by mouth.  raNITIdine (ZANTAC) 150 mg tablet Take 150 mg by mouth two (2) times a day.  omeprazole (PRILOSEC) 20 mg capsule Take 20 mg by mouth daily.  loratadine (CLARITIN) 10 mg tablet Take 1 Tab by mouth daily. Indications: URTICARIA 30 Tab 0    hydrOXYzine HCl (ATARAX) 25 mg tablet 25 mg.  0     Allergies   Allergen Reactions    Codeine Hives    Gluten Hives    Rye Grass Extract Hives     History reviewed. No pertinent past medical history. History reviewed. No pertinent surgical history. Review of Systems    A comprehensive review of systems was negative except for that written in the HPI.      Objective:     Visit Vitals  BP (!) 83/58 (BP 1 Location: Right arm, BP Patient Position: Sitting)   Pulse (!) 105   Temp 98.2 °F (36.8 °C)   Resp 18   Ht 5' 5\" (1.651 m)   Wt 214 lb (97.1 kg)   SpO2 99%   BMI 35.61 kg/m² General appearance: alert, severe distress  Face is edematous with erythema, lips with edema  Hyperventilating  Complete exam unable to be done due to emergency nature of her visit. Nursing note and vitals reviewed  Assessment/Plan:       ICD-10-CM ICD-9-CM    1. Allergic reaction, initial encounter T78.40XA 995.3    2. Anxiety F41.9 300.00         >40 min spent with the patient from time presentation until EMS squad arrived, patient was extremely anxious which made symptoms worse. Care was safely transferred to EMS. Follow-up Disposition: Not on File     Advised her to call back or return to office if symptoms worsen/change/persist.  Discussed expected course/resolution/complications of diagnosis in detail with patient. Medication risks/benefits/costs/interactions/alternatives discussed with patient. She was given an after visit summary which includes diagnoses, current medications, & vitals. She expressed understanding with the diagnosis and plan.

## 2018-11-07 NOTE — PROGRESS NOTES
Room 5    Chief Complaint   Patient presents with   [facility]     Patient states she has hives all over the body. Facial swelling. Patient went to [facility] for same problem about 1 month ago. Current issue started 30 minutes ago     1. Have you been to the ER, urgent care clinic since your last visit? Hospitalized since your last visit? Yes Reason for visit: To [facility] x 1 month for Hives and swelling    2. Have you seen or consulted any other health care providers outside of the [facility] since your last visit? Include any pap smears or colon screening.  To [facility] x 1 month ago

## 2018-11-08 DIAGNOSIS — T78.40XA ALLERGIC REACTION, INITIAL ENCOUNTER: Primary | ICD-10-CM

## 2019-01-02 DIAGNOSIS — F90.9 ATTENTION DEFICIT HYPERACTIVITY DISORDER (ADHD), UNSPECIFIED ADHD TYPE: ICD-10-CM

## 2019-04-15 ENCOUNTER — OFFICE VISIT (OUTPATIENT)
Dept: INTERNAL MEDICINE CLINIC | Facility: CLINIC | Age: 24
End: 2019-04-15

## 2019-04-15 VITALS
TEMPERATURE: 98.9 F | WEIGHT: 242 LBS | SYSTOLIC BLOOD PRESSURE: 94 MMHG | BODY MASS INDEX: 40.32 KG/M2 | DIASTOLIC BLOOD PRESSURE: 69 MMHG | HEART RATE: 85 BPM | HEIGHT: 65 IN | RESPIRATION RATE: 16 BRPM | OXYGEN SATURATION: 97 %

## 2019-04-15 DIAGNOSIS — J30.1 SEASONAL ALLERGIC RHINITIS DUE TO POLLEN: Primary | ICD-10-CM

## 2019-04-15 RX ORDER — FLUTICASONE PROPIONATE 50 MCG
SPRAY, SUSPENSION (ML) NASAL
Qty: 1 BOTTLE | Refills: 5 | Status: SHIPPED | OUTPATIENT
Start: 2019-04-15 | End: 2019-04-16 | Stop reason: ALTCHOICE

## 2019-04-15 RX ORDER — CETIRIZINE HYDROCHLORIDE, PSEUDOEPHEDRINE HYDROCHLORIDE 5; 120 MG/1; MG/1
1 TABLET, FILM COATED, EXTENDED RELEASE ORAL 2 TIMES DAILY
Qty: 60 TAB | Refills: 1 | Status: SHIPPED | OUTPATIENT
Start: 2019-04-15 | End: 2020-08-25

## 2019-04-15 RX ORDER — CETIRIZINE HCL 10 MG
10 TABLET ORAL
COMMUNITY
End: 2019-04-15 | Stop reason: SDUPTHER

## 2019-04-15 RX ORDER — LEVONORGESTREL AND ETHINYL ESTRADIOL 0.1-0.02MG
KIT ORAL
COMMUNITY
Start: 2018-04-13 | End: 2019-04-15 | Stop reason: SDUPTHER

## 2019-04-15 NOTE — PROGRESS NOTES
Subjective:      Quan Smith is a 30 y.o. female who presents today for allergies. Allergies: she notes her allergies are problematic, she is experiencing congestion, sneezing, snoring at night. She has tried zyrtec,sudafed. She has not tried any nasal sprays. She denies fevers, chills, sweats, feeling sick. Patient Active Problem List    Diagnosis Date Noted    Severe obesity ([facility] [address] 11/07/2018    Obesity, Class II, BMI 35-39.9 04/30/2018    Idiopathic urticaria 06/26/2017    ADHD (attention deficit hyperactivity disorder) 03/03/2017     Current Outpatient Medications   Medication Sig Dispense Refill    Cetirizine (ZYRTEC) 10 mg cap Take  by mouth.  hydrOXYzine HCl (ATARAX) 25 mg tablet 25 mg.  0    cetirizine (ZYRTEC) 10 mg tablet Take  by mouth.  LARISSIA 0.1-20 mg-mcg tab       biotin 1,000 mcg chew Take 2,000 mg by mouth.  cetirizine (ZYRTEC) 10 mg tablet Take 10 mg by mouth.  lisdexamfetamine (VYVANSE) 20 mg capsule Take 1 Cap (20 mg total) by mouth dailyEarliest Fill Date: 1/2/19. Max Daily Amount: 20 mg 30 Cap 0    raNITIdine (ZANTAC) 150 mg tablet Take 150 mg by mouth two (2) times a day.  omeprazole (PRILOSEC) 20 mg capsule Take 20 mg by mouth daily. Allergies   Allergen Reactions    Codeine Hives    Gluten Hives    Rye Grass Extract Hives     History reviewed. No pertinent past medical history. History reviewed. No pertinent surgical history. Review of Systems    A comprehensive review of systems was negative except for that written in the HPI.      Objective:     Visit Vitals  BP 94/69 (BP 1 Location: Left arm, BP Patient Position: Sitting)   Pulse 85   Temp 98.9 °F (37.2 °C) (Oral)   Resp 16   Ht 5' 5\" (1.651 m)   Wt 242 lb (109.8 kg)   LMP 03/29/2019 (Approximate)   SpO2 97%   BMI 40.27 kg/m²     General appearance: alert, cooperative, no distress, appears stated age  Head: Normocephalic, without obvious abnormality, atraumatic  Eyes: negative  Ears: normal TM's and external ear canals AU  Nose: turbinates edematous, inflamed, no sinus tenderness  Throat: Lips, mucosa, and tongue normal. Teeth and gums normal  Lungs: clear to auscultation bilaterally  Heart: regular rate and rhythm, S1, S2 normal, no murmur, click, rub or gallop  Neurologic: Grossly normal  Nursing note and vitals reviewed  Assessment/Plan:       ICD-10-CM ICD-9-CM    1. Seasonal allergic rhinitis due to pollen J30.1 477.0 fluticasone propionate (FLONASE) 50 mcg/actuation nasal spray      cetirizine-pseudoePHEDrine (ZYRTEC-D) 5-120 mg Tb12   If no improvement by Thursday I will send xyza.     Follow-up and Dispositions    · Return if symptoms worsen or fail to improve. .     Advised her to call back or return to office if symptoms worsen/change/persist.  Discussed expected course/resolution/complications of diagnosis in detail with patient. Medication risks/benefits/costs/interactions/alternatives discussed with patient. She was given an after visit summary which includes diagnoses, current medications, & vitals. She expressed understanding with the diagnosis and plan.

## 2019-04-15 NOTE — PROGRESS NOTES
Identified pt with two pt identifiers(name and ). Reviewed record in preparation for visit and have obtained necessary documentation. Chief Complaint   Patient presents with    Allergies        Health Maintenance Due   Topic    PAP AKA CERVICAL CYTOLOGY     HPV Age 9Y-34Y (2 - Female 3-dose series)       Coordination of Care Questionnaire:  :   1) Have you been to an emergency room, urgent care, or hospitalized since your last visit? If yes, where when, and reason for visit? no       2. Have seen or consulted any other health care provider since your last visit? If yes, where when, and reason for visit? NO      3) Do you have an Advanced Directive/ Living Will in place? NO  If yes, do we have a copy on file NO  If no, would you like information NO    Patient is accompanied by self I have received verbal consent from Joy Yates to discuss any/all medical information while they are present in the room.     Visit Vitals  BP 94/69 (BP 1 Location: Left arm, BP Patient Position: Sitting)   Pulse 85   Temp 98.9 °F (37.2 °C) (Oral)   Resp 16   Ht 5' 5\" (1.651 m)   Wt 242 lb (109.8 kg)   SpO2 97%   BMI 40.27 kg/m²     Sudheer Spencer LPN

## 2019-04-15 NOTE — PATIENT INSTRUCTIONS
Managing Your Allergies: Care Instructions  Your Care Instructions    Managing your allergies is an important part of staying healthy. Your doctor will help you find out what may be causing the allergies. Common causes of allergy symptoms are house dust and dust mites, animal dander, mold, and pollen. As soon as you know what triggers your symptoms, try to reduce your exposure to your triggers. This can help prevent allergy symptoms, asthma, and other health problems. Ask your doctor about allergy medicine or immunotherapy. These treatments may help reduce or prevent allergy symptoms. Follow-up care is a key part of your treatment and safety. Be sure to make and go to all appointments, and call your doctor if you are having problems. It's also a good idea to know your test results and keep a list of the medicines you take. How can you care for yourself at home? · If you think that dust or dust mites are causing your allergies:  ? Wash sheets, pillowcases, and other bedding every week in hot water. ? Use airtight, dust-proof covers for pillows, duvets, and mattresses. Avoid plastic covers, because they tend to tear quickly and do not \"breathe. \" Wash according to the instructions. ? Remove extra blankets and pillows that you don't need. ? Use blankets that are machine-washable. ? Don't use home humidifiers. They can help mites live longer. · Use air-conditioning. Change or clean all filters every month. Keep windows closed. Use high-efficiency air filters. Don't use window or attic fans, which draw dust into the air. · If you're allergic to pet dander, keep pets outside or, at the very least, out of your bedroom. Old carpet and cloth-covered furniture can hold a lot of animal dander. You may need to replace them. · Look for signs of cockroaches. Use cockroach baits to get rid of them. Then clean your home well. · If you're allergic to mold, don't keep indoor plants, because molds can grow in soil. Get rid of furniture, rugs, and drapes that smell musty. Check for mold in the bathroom. · If you're allergic to pollen, stay inside when pollen counts are high. · Don't smoke or let anyone else smoke in your house. Don't use fireplaces or wood-burning stoves. Avoid paint fumes, perfumes, and other strong odors. When should you call for help? Give an epinephrine shot if:    · You think you are having a severe allergic reaction.    After giving an epinephrine shot call 911, even if you feel better.   Call 911 if:    · You have symptoms of a severe allergic reaction. These may include:  ? Sudden raised, red areas (hives) all over your body. ? Swelling of the throat, mouth, lips, or tongue. ? Trouble breathing. ? Passing out (losing consciousness). Or you may feel very lightheaded or suddenly feel weak, confused, or restless.     · You have been given an epinephrine shot, even if you feel better.    Call your doctor now or seek immediate medical care if:    · You have symptoms of an allergic reaction, such as:  ? A rash or hives (raised, red areas on the skin). ? Itching. ? Swelling. ? Belly pain, nausea, or vomiting.    Watch closely for changes in your health, and be sure to contact your doctor if:    · Your allergies get worse.     · You need help controlling your allergies.     · You have questions about allergy testing.     · You do not get better as expected. Where can you learn more? Go to http://aparna-darlene.info/. Enter L249 in the search box to learn more about \"Managing Your Allergies: Care Instructions. \"  Current as of: June 27, 2018  Content Version: 11.9  © 9313-3363 [employer]. Care instructions adapted under license by [employer] (which disclaims liability or warranty for this information).  If you have questions about a medical condition or this instruction, always ask your healthcare professional. [employer] disclaims any warranty or liability for your use of this information.

## 2019-04-16 RX ORDER — MOMETASONE FUROATE 50 UG/1
2 SPRAY, METERED NASAL DAILY
Qty: 1 CONTAINER | Refills: 5 | Status: SHIPPED | OUTPATIENT
Start: 2019-04-16 | End: 2019-04-26

## 2019-04-30 NOTE — PROGRESS NOTES
Outpatient Physical Therapy  DAILY TREATMENT     [facility] [place]  [address], [address]  [facility] [place] [address]  Phone:  [phone]  Fax:  [phone]    Date: 04/30/2019    Patient: Yoli Carmichael  YOB: 1935  MRN: [id number]     Time Calculation  Start time: 1000  Stop time: 1030 Time Calculation (min): 30 minutes     Chief Complaint: Loss Of Balance    Visit #: 6    SUBJECTIVE:  Patient reports no slips or trips. States she feels her leg is getting stronger.     OBJECTIVE:  Current objective measures:   Able to hold 15sec on DD          Therapeutic Exercises (CPT 40082):     2. Nu Step, x10min    3. Basic TrA with LE lift, x20    4. Shuttle, L5 x90    5. Sit to stand, x10    Therapeutic Treatments and Modalities:     1. Neuromuscular Re-education (CPT 42656), balance in corner NBOS eyes open/closed; balance on foam pad eyes open/closed nbos/wbos balance on 1/2 foam roller ; sit to stand transfers with eyes closed; DD sits with all legs off; standing dd     Time-based treatments/modalities:  Therapeutic exercise minutes (CPT 19625): 20 minutes  Neuromusc re-ed, balance, coor, post minutes (CPT 52156): 10 minutes       Pain rating before treatment: 0  Pain rating after treatment: 0    ASSESSMENT:   Response to treatment: Patient responded well to therapy with an increase stance time with balance tasks. Patient is tolerating more exercise. Patient still has difficulty with balance with eyes closed.     PLAN/RECOMMENDATIONS:   Plan for treatment: therapy treatment to continue next visit.  Planned interventions for next visit: E-stim unattended (CPT 25557), manual therapy (CPT 78138), neuromuscular re-education (CPT 17452) and therapeutic exercise (CPT 66063).       Subjective:  
  
Mandy Morales is a 20 y.o. female who presents today for Chief Complaint Patient presents with  Medication Evaluation [place] ADHD- patient has been on Mydayis for last several months. Denies any side effects. She says it has been working very well for her. She has no complaints today. Patient Active Problem List  
 Diagnosis Date Noted  Obesity, Class II, BMI 35-39.9 04/30/2018  Idiopathic urticaria 06/26/2017  ADHD (attention deficit hyperactivity disorder) 03/03/2017 Current Outpatient Prescriptions Medication Sig Dispense Refill  dextroamphetamine-amphetamine (MYDAYIS) 25 mg CT24 Take 1 Tab by mouth daily. Max Daily Amount: 1 Tab. 30 Each 0  
 cetirizine (ZYRTEC) 10 mg tablet Take  by mouth.  LARISSIA 0.1-20 mg-mcg tab  biotin 1,000 mcg chew Take 2,000 mg by mouth.  raNITIdine (ZANTAC) 150 mg tablet Take 150 mg by mouth two (2) times a day.  omeprazole (PRILOSEC) 20 mg capsule Take 20 mg by mouth daily.  loratadine (CLARITIN) 10 mg tablet Take 1 Tab by mouth daily. Indications: URTICARIA 30 Tab 0 Allergies Allergen Reactions  Codeine Hives  Gluten Hives  Rye Grass Extract Hives History reviewed. No pertinent past medical history. History reviewed. No pertinent surgical history. Family History Problem Relation Age of Onset  No Known Problems Mother  Arthritis-osteo Father  Cancer Maternal Grandmother   
  breast  
 Breast Cancer Maternal Grandmother  Cancer Paternal Grandfather   
  thyroid cancer- questionable history Social History Substance Use Topics  Smoking status: Never Smoker  Smokeless tobacco: Never Used  Alcohol use Yes Review of Systems A comprehensive review of systems was negative except for that written in the HPI. Objective:  
 
Visit Vitals  BP 95/67 (BP 1 Location: Left arm, BP Patient Position: Sitting)  Pulse 95  Temp 99 °F (37.2 °C) (Oral)  Resp 18  Ht 5' 5\" (1.651 m)  Wt 213 lb (96.6 kg)  LMP 08/28/2018  SpO2 98%  BMI 35.45 kg/m2 General:  Alert, cooperative, no distress, appears stated age. Head:  Normocephalic, without obvious abnormality, atraumatic. Eyes:  Conjunctivae/corneas clear. PERRL, EOMs intact. Fundi benign. Ears:  Normal TMs and external ear canals both ears. Nose: Nares normal. Septum midline. Mucosa normal. No drainage or sinus tenderness. Throat: Lips, mucosa, and tongue normal. Teeth and gums normal.  
Neck: Supple, symmetrical, trachea midline, no adenopathy, thyroid: no enlargement/tenderness/nodules, no carotid bruit and no JVD. Back:   Symmetric, no curvature. ROM normal. No CVA tenderness. Lungs:   Clear to auscultation bilaterally. Chest wall:  No tenderness or deformity. Heart:  Regular rate and rhythm, S1, S2 normal, no murmur, click, rub or gallop. Abdomen:   Soft, non-tender. Bowel sounds normal. No masses,  No organomegaly. Extremities: Extremities normal, atraumatic, no cyanosis or edema. Pulses: 2+ and symmetric all extremities. Skin: Skin color, texture, turgor normal. No rashes or lesions. Lymph nodes: Cervical, supraclavicular, and axillary nodes normal.  
Neurologic: CNII-XII intact. Normal strength, sensation and reflexes throughout. Assessment/Plan: ICD-10-CM ICD-9-CM 1. Attention deficit hyperactivity disorder (ADHD), unspecified ADHD type F90.9 314.01 dextroamphetamine-amphetamine (MYDAYIS) 25 mg CT24 Follow-up Disposition: Not on File Advised her to call back or return to office if symptoms worsen/change/persist. 
Discussed expected course/resolution/complications of diagnosis in detail with patient. Medication risks/benefits/costs/interactions/alternatives discussed with patient. She was given an after visit summary which includes diagnoses, current medications, & vitals. She expressed understanding with the diagnosis and plan.

## 2019-05-13 ENCOUNTER — OFFICE VISIT (OUTPATIENT)
Dept: INTERNAL MEDICINE CLINIC | Facility: CLINIC | Age: 24
End: 2019-05-13

## 2019-05-13 VITALS
TEMPERATURE: 99.2 F | HEIGHT: 65 IN | SYSTOLIC BLOOD PRESSURE: 102 MMHG | WEIGHT: 239.5 LBS | BODY MASS INDEX: 39.9 KG/M2 | RESPIRATION RATE: 16 BRPM | DIASTOLIC BLOOD PRESSURE: 69 MMHG | HEART RATE: 80 BPM

## 2019-05-13 DIAGNOSIS — B35.4 TINEA CORPORIS: Primary | ICD-10-CM

## 2019-05-13 RX ORDER — FLUCONAZOLE 150 MG/1
TABLET ORAL
Qty: 2 TAB | Refills: 0 | Status: SHIPPED | OUTPATIENT
Start: 2019-05-13 | End: 2020-05-07

## 2019-05-13 RX ORDER — KETOCONAZOLE 20 MG/G
CREAM TOPICAL DAILY
Qty: 15 G | Refills: 0 | Status: SHIPPED | OUTPATIENT
Start: 2019-05-13 | End: 2020-05-07

## 2019-05-13 NOTE — PROGRESS NOTES
Chief Complaint   Patient presents with    Rash     irritation under both breast not sure what is causing the irritation. 1. Have you been to the ER, urgent care clinic since your last visit? Hospitalized since your last visit? No    2. Have you seen or consulted any other health care providers outside of the [address] [place] [address] since your last visit? Include any pap smears or colon screening.  No

## 2019-05-13 NOTE — PROGRESS NOTES
Subjective:      Kiana Hernandez is a 30 y.o. female who presents today for rash. Rash: symptoms started a week ago, they include red rash and irritation under bilateral breasts, it is also itching. She denies fevers, chills, spreading. She has tried Eucerin, generic neosporin, hydrocortisone. She notes symptoms are stable. Patient Active Problem List    Diagnosis Date Noted    Severe obesity ([address] 11/07/2018    Obesity, Class II, BMI 35-39.9 04/30/2018    Idiopathic urticaria 06/26/2017    ADHD (attention deficit hyperactivity disorder) 03/03/2017     Current Outpatient Medications   Medication Sig Dispense Refill    cetirizine-pseudoePHEDrine (ZYRTEC-D) 5-120 mg Tb12 Take 1 Tab by mouth two (2) times a day. 60 Tab 1    hydrOXYzine HCl (ATARAX) 25 mg tablet 25 mg.  0    LARISSIA 0.1-20 mg-mcg tab       biotin 1,000 mcg chew Take 2,000 mg by mouth.  raNITIdine (ZANTAC) 150 mg tablet Take 150 mg by mouth two (2) times a day.  omeprazole (PRILOSEC) 20 mg capsule Take 20 mg by mouth daily. Allergies   Allergen Reactions    Codeine Hives    Gluten Hives    Rye Grass Extract Hives     No past medical history on file. No past surgical history on file. Review of Systems    A comprehensive review of systems was negative except for that written in the HPI. Objective:     Visit Vitals  /69   Pulse 80   Temp 99.2 °F (37.3 °C) (Oral)   Resp 16   Ht 5' 5\" (1.651 m)   Wt 239 lb 8 oz (108.6 kg)   BMI 39.85 kg/m²     General appearance: alert, cooperative, no distress, appears stated age  Head: Normocephalic, without obvious abnormality, atraumatic  Eyes: negative  Lungs: clear to auscultation bilaterally  Heart: regular rate and rhythm, S1, S2 normal, no murmur, click, rub or gallop  Skin: erythema macular rash noted under bilateral breast  Neurologic: Grossly normal  Nursing note and vitals reviewed  Assessment/Plan:       ICD-10-CM ICD-9-CM    1.  Tinea corporis B35.4 110.5 ketoconazole (NIZORAL) 2 % topical cream      fluconazole (DIFLUCAN) 150 mg tablet     Follow-up and Dispositions    · Return if symptoms worsen or fail to improve. Advised her to call back or return to office if symptoms worsen/change/persist.  Discussed expected course/resolution/complications of diagnosis in detail with patient. Medication risks/benefits/costs/interactions/alternatives discussed with patient. She was given an after visit summary which includes diagnoses, current medications, & vitals. She expressed understanding with the diagnosis and plan.

## 2019-05-13 NOTE — LETTER
NOTIFICATION RETURN TO WORK / SCHOOL 
 
5/13/2019 12:05 PM 
 
Ms. Estrellita Lozano [address] [id number] To Whom It May Concern: 
 
Estrellita Lozano is currently under the care of Sharla Coronel. She was seen on 5/13/19 She will return to work/school on: 5/13/19 If there are questions or concerns please have the patient contact our office.  
 
 
 
Sincerely, 
 
 
Ced Rodriguez NP

## 2019-07-22 ENCOUNTER — OFFICE VISIT (OUTPATIENT)
Dept: INTERNAL MEDICINE CLINIC | Facility: CLINIC | Age: 24
End: 2019-07-22

## 2019-07-22 VITALS
BODY MASS INDEX: 39.32 KG/M2 | RESPIRATION RATE: 16 BRPM | TEMPERATURE: 99.1 F | HEIGHT: 65 IN | SYSTOLIC BLOOD PRESSURE: 100 MMHG | HEART RATE: 77 BPM | DIASTOLIC BLOOD PRESSURE: 66 MMHG | WEIGHT: 236 LBS

## 2019-07-22 DIAGNOSIS — F90.9 ATTENTION DEFICIT HYPERACTIVITY DISORDER (ADHD), UNSPECIFIED ADHD TYPE: ICD-10-CM

## 2019-07-22 DIAGNOSIS — E66.9 OBESITY, CLASS II, BMI 35-39.9: Primary | ICD-10-CM

## 2019-07-22 RX ORDER — DEXTROAMPHETAMINE SACCHARATE, AMPHETAMINE ASPARTATE MONOHYDRATE, DEXTROAMPHETAMINE SULFATE AND AMPHETAMINE SULFATE 7.5; 7.5; 7.5; 7.5 MG/1; MG/1; MG/1; MG/1
30 CAPSULE, EXTENDED RELEASE ORAL
Qty: 30 CAP | Refills: 0 | Status: SHIPPED | OUTPATIENT
Start: 2019-08-21 | End: 2019-07-22 | Stop reason: SDUPTHER

## 2019-07-22 RX ORDER — DEXTROAMPHETAMINE SACCHARATE, AMPHETAMINE ASPARTATE MONOHYDRATE, DEXTROAMPHETAMINE SULFATE AND AMPHETAMINE SULFATE 7.5; 7.5; 7.5; 7.5 MG/1; MG/1; MG/1; MG/1
30 CAPSULE, EXTENDED RELEASE ORAL
Qty: 30 CAP | Refills: 0 | Status: SHIPPED | OUTPATIENT
Start: 2019-07-22 | End: 2019-07-22 | Stop reason: SDUPTHER

## 2019-07-22 RX ORDER — DEXTROAMPHETAMINE SACCHARATE, AMPHETAMINE ASPARTATE MONOHYDRATE, DEXTROAMPHETAMINE SULFATE AND AMPHETAMINE SULFATE 7.5; 7.5; 7.5; 7.5 MG/1; MG/1; MG/1; MG/1
30 CAPSULE, EXTENDED RELEASE ORAL
Qty: 30 CAP | Refills: 0 | Status: SHIPPED | OUTPATIENT
Start: 2019-09-20 | End: 2019-11-26 | Stop reason: SDUPTHER

## 2019-07-22 NOTE — PROGRESS NOTES
Subjective:      Butch Kehr is a 30 y.o. female who presents today for follow up. Mental Health Review  Patient is seen for ADHD. Current treatment regimen includes: she has tried mydayis and vyvanse, her insurance will not cover the mydayis despite it working well. She states the vyvanse did not work as well. Medication compliance: all of the time. Pt reports the following side effects: nothing. [facility], she has not had any scripts filled since Jan 2019. Body mass index is 39.27 kg/m². She joined [employer] and states this has been a great fit for her. She also got a dog and notes she is walking more. Weight loss efforts encouraged! Wt Readings from Last 3 Encounters:   07/22/19 236 lb (107 kg)   05/13/19 239 lb 8 oz (108.6 kg)   04/15/19 242 lb (109.8 kg)     Patient Active Problem List    Diagnosis Date Noted    Severe obesity [address] [address] 11/07/2018    Obesity, Class II, BMI 35-39.9 04/30/2018    Idiopathic urticaria 06/26/2017    ADHD (attention deficit hyperactivity disorder) 03/03/2017     Current Outpatient Medications   Medication Sig Dispense Refill    fluticasone propionate (FLONASE NA) by Nasal route.  ketoconazole (NIZORAL) 2 % topical cream Apply  to affected area daily. 15 g 0    fluconazole (DIFLUCAN) 150 mg tablet Take 1 tab today and another in 72 hours. FDA advises cautious prescribing of oral fluconazole in pregnancy. 2 Tab 0    cetirizine-pseudoePHEDrine (ZYRTEC-D) 5-120 mg Tb12 Take 1 Tab by mouth two (2) times a day. 60 Tab 1    LARISSIA 0.1-20 mg-mcg tab       hydrOXYzine HCl (ATARAX) 25 mg tablet 25 mg.  0     Allergies   Allergen Reactions    Codeine Hives    Gluten Hives    Rye Grass Extract Hives     No past medical history on file. No past surgical history on file. Review of Systems    A comprehensive review of systems was negative except for that written in the HPI.      Objective:     Visit Vitals  /66   Pulse 77   Temp 99.1 °F (37.3 °C) (Oral)   Resp 16   Ht 5' 5\" (1.651 m)   Wt 236 lb (107 kg)   LMP 07/03/2019 (Approximate)   BMI 39.27 kg/m²     General appearance: alert, cooperative, no distress, appears stated age  Head: Normocephalic, without obvious abnormality, atraumatic  Eyes: negative  Lungs: clear to auscultation bilaterally  Heart: regular rate and rhythm, S1, S2 normal, no murmur, click, rub or gallop  Neurologic: Grossly normal  Psych: appropriate mood, speech, affect    Nursing note and vitals reviewed  Assessment/Plan:       ICD-10-CM ICD-9-CM    1. Obesity, Class II, BMI 35-39.9 E66.9 278.00    2. Attention deficit hyperactivity disorder (ADHD), unspecified ADHD type F90.9 314.01 amphetamine-dextroamphetamine XR (ADDERALL XR) 30 mg XR capsule      DISCONTINUED: amphetamine-dextroamphetamine XR (ADDERALL XR) 30 mg XR capsule      DISCONTINUED: amphetamine-dextroamphetamine XR (ADDERALL XR) 30 mg XR capsule             Advised her to call back or return to office if symptoms worsen/change/persist.  Discussed expected course/resolution/complications of diagnosis in detail with patient. Medication risks/benefits/costs/interactions/alternatives discussed with patient. She was given an after visit summary which includes diagnoses, current medications, & vitals. She expressed understanding with the diagnosis and plan.

## 2019-07-22 NOTE — PROGRESS NOTES
Chief Complaint   Patient presents with    Medication Evaluation     since insurance wont cover mydayis would like to go back to [facility].      1. Have you been to the ER, urgent care clinic since your last visit? Hospitalized since your last visit? No    2. Have you seen or consulted any other health care providers outside of the [address] [place] [address] since your last visit? Include any pap smears or colon screening.  No

## 2019-07-22 NOTE — PATIENT INSTRUCTIONS
This is the website for the prescription assistance program:      AAVLife/patients/patient-services/alex-cares

## 2019-10-28 ENCOUNTER — OFFICE VISIT (OUTPATIENT)
Dept: INTERNAL MEDICINE CLINIC | Age: 24
End: 2019-10-28

## 2019-10-28 VITALS — WEIGHT: 234 LBS | BODY MASS INDEX: 38.99 KG/M2 | HEIGHT: 65 IN

## 2019-10-28 DIAGNOSIS — F90.9 ATTENTION DEFICIT HYPERACTIVITY DISORDER (ADHD), UNSPECIFIED ADHD TYPE: ICD-10-CM

## 2019-10-28 RX ORDER — DEXTROAMPHETAMINE SACCHARATE, AMPHETAMINE ASPARTATE MONOHYDRATE, DEXTROAMPHETAMINE SULFATE AND AMPHETAMINE SULFATE 7.5; 7.5; 7.5; 7.5 MG/1; MG/1; MG/1; MG/1
30 CAPSULE, EXTENDED RELEASE ORAL
Qty: 30 CAP | Refills: 0 | Status: CANCELLED | OUTPATIENT
Start: 2019-10-28 | End: 2019-11-27

## 2019-10-28 RX ORDER — DEXTROAMPHETAMINE SACCHARATE, AMPHETAMINE ASPARTATE MONOHYDRATE, DEXTROAMPHETAMINE SULFATE AND AMPHETAMINE SULFATE 7.5; 7.5; 7.5; 7.5 MG/1; MG/1; MG/1; MG/1
30 CAPSULE, EXTENDED RELEASE ORAL
Qty: 30 CAP | Refills: 0 | Status: CANCELLED | OUTPATIENT
Start: 2019-11-28 | End: 2019-12-28

## 2019-10-28 RX ORDER — DEXTROAMPHETAMINE SACCHARATE, AMPHETAMINE ASPARTATE MONOHYDRATE, DEXTROAMPHETAMINE SULFATE AND AMPHETAMINE SULFATE 7.5; 7.5; 7.5; 7.5 MG/1; MG/1; MG/1; MG/1
30 CAPSULE, EXTENDED RELEASE ORAL
Qty: 30 CAP | Refills: 0 | Status: SHIPPED | OUTPATIENT
Start: 2019-12-28 | End: 2020-01-07 | Stop reason: SDUPTHER

## 2019-10-28 NOTE — PROGRESS NOTES
Follow Up Visit    Jenise Quintanilla is a 30 y.o. female. she presents for Medication Evaluation    The patient comes for follow up and for refill of her ADHD medications. She tells me that she was diagnosed in college. However, when asked, she reports never having had formal testing. She was told by her  that she may have ADHD and was advised to see her doctor. She went to her old primary care doctor whom she says started her on medications. She does not remember going to see a psychologist for this at anytime. She thinks she may remember some testing done at that physician's office. She is presently on Adderall XR with no adverse effects. Patient Active Problem List   Diagnosis Code    ADHD (attention deficit hyperactivity disorder) F90.9    Idiopathic urticaria L50.1    Obesity, Class II, BMI 35-39.9 E66.9    Severe obesity ([address] E66.01         Prior to Admission medications    Medication Sig Start Date End Date Taking? Authorizing Provider   amphetamine-dextroamphetamine XR (ADDERALL XR) 30 mg XR capsule Take 1 Cap by mouth every morning for 30 days. Max Daily Amount: 30 mg. 12/28/19 1/27/20 Yes Jen Flores MD   amphetamine-dextroamphetamine XR (ADDERALL XR) 30 mg XR capsule Take 1 Cap by mouth every morning. Max Daily Amount: 30 mg. 9/20/19  Yes Skiff, Wynema Jenkins, NP   fluticasone propionate (FLONASE NA) by Nasal route. Yes Provider, Historical   cetirizine-pseudoePHEDrine (ZYRTEC-D) 5-120 mg Tb12 Take 1 Tab by mouth two (2) times a day. 4/15/19  Yes Skiff, Wynema Jenkins, NP   LARISSIA 0.1-20 mg-mcg tab  4/13/18  Yes Provider, Historical   ketoconazole (NIZORAL) 2 % topical cream Apply  to affected area daily. 5/13/19   Skiff, Wynema Jenkins, NP   fluconazole (DIFLUCAN) 150 mg tablet Take 1 tab today and another in 72 hours. FDA advises cautious prescribing of oral fluconazole in pregnancy. 5/13/19   Skiff, Wynema Jenkins, NP   hydrOXYzine HCl (ATARAX) 25 mg tablet 25 mg. 9/26/18   Provider, Historical         Health Maintenance   Topic Date Due    PAP AKA CERVICAL CYTOLOGY  01/04/2014    HPV Age 9Y-34Y (2 - Female 3-dose series) 04/06/2017    Influenza Age 5 to Adult  08/01/2019    DTaP/Tdap/Td series (2 - Td) 05/01/2028    Pneumococcal 0-64 years  Aged Out       Review of Systems   Constitutional: Negative. Respiratory: Negative. Cardiovascular: Negative. Visit Vitals  Ht 5' 5\" (1.651 m)   Wt 234 lb (106.1 kg)   LMP 10/13/2019   BMI 38.94 kg/m²       Physical Exam   Constitutional: She is oriented to person, place, and time. No distress. Neurological: She is alert and oriented to person, place, and time. Psychiatric: She has a normal mood and affect. ASSESSMENT/PLAN    Diagnoses and all orders for this visit:    1. Attention deficit hyperactivity disorder (ADHD), unspecified ADHD type - Discussed with the patient that she may need to follow up with a psychologist for formal testing to truly determine her diagnosis. However, first, we will need to have records from her previous provider. Advised that I will provide 30 days of medication.  reviewed. Last filled 9/26/19    -     amphetamine-dextroamphetamine XR (ADDERALL XR) 30 mg XR capsule; Take 1 Cap by mouth every morning for 30 days. Max Daily Amount: 30 mg. Follow-up and Dispositions    · Return if symptoms worsen or fail to improve.

## 2019-10-28 NOTE — PROGRESS NOTES
Chief Complaint   Patient presents with    Medication Evaluation     1. Have you been to the ER, urgent care clinic since your last visit? Hospitalized since your last visit? No    2. Have you seen or consulted any other health care providers outside of the [address] [place] [place] [address] since your last visit? Include any pap smears or colon screening.  No

## 2019-11-26 DIAGNOSIS — F90.9 ATTENTION DEFICIT HYPERACTIVITY DISORDER (ADHD), UNSPECIFIED ADHD TYPE: ICD-10-CM

## 2019-11-26 NOTE — TELEPHONE ENCOUNTER
Last refill- 9/26/16  Last office visit - 10/28/2019  Next office visit - No future appointments. Requested Prescriptions     Pending Prescriptions Disp Refills    amphetamine-dextroamphetamine XR (ADDERALL XR) 30 mg XR capsule 30 Cap 0     Sig: Take 1 Cap by mouth every morning. Max Daily Amount: 30 mg.

## 2019-11-29 ENCOUNTER — TELEPHONE (OUTPATIENT)
Dept: INTERNAL MEDICINE CLINIC | Age: 24
End: 2019-11-29

## 2019-11-29 RX ORDER — DEXTROAMPHETAMINE SACCHARATE, AMPHETAMINE ASPARTATE MONOHYDRATE, DEXTROAMPHETAMINE SULFATE AND AMPHETAMINE SULFATE 7.5; 7.5; 7.5; 7.5 MG/1; MG/1; MG/1; MG/1
30 CAPSULE, EXTENDED RELEASE ORAL
Qty: 30 CAP | Refills: 0 | Status: SHIPPED | OUTPATIENT
Start: 2019-11-29 | End: 2020-02-24 | Stop reason: SDUPTHER

## 2019-11-29 NOTE — TELEPHONE ENCOUNTER
Dr. Milan Fajardo   Received: Today   Message Contents   [place] [facility] ORTH[facility] Front Office Pool             Medication Refill     Caller (if not patient):       Relationship of caller (if not patient):       Best contact number(s): [phone]       Name of medication and dosage if known:\"Adderall' XR 30 mg       Is patient out of this medication (yes/no):yes       [facility] [address] listed in chart? (yes/no):yes   Pharmacy phone number:       Details to clarify the request:Pt stated she requested a refill on Wednesday and would like to know if it has been approved and called to the pharmacy.        Rona Concepcion      From [facility]

## 2020-01-07 DIAGNOSIS — F90.9 ATTENTION DEFICIT HYPERACTIVITY DISORDER (ADHD), UNSPECIFIED ADHD TYPE: ICD-10-CM

## 2020-01-07 RX ORDER — DEXTROAMPHETAMINE SACCHARATE, AMPHETAMINE ASPARTATE MONOHYDRATE, DEXTROAMPHETAMINE SULFATE AND AMPHETAMINE SULFATE 7.5; 7.5; 7.5; 7.5 MG/1; MG/1; MG/1; MG/1
30 CAPSULE, EXTENDED RELEASE ORAL
Qty: 30 CAP | Refills: 0 | Status: SHIPPED | OUTPATIENT
Start: 2020-01-07 | End: 2020-02-06

## 2020-02-24 DIAGNOSIS — F90.9 ATTENTION DEFICIT HYPERACTIVITY DISORDER (ADHD), UNSPECIFIED ADHD TYPE: ICD-10-CM

## 2020-02-24 RX ORDER — DEXTROAMPHETAMINE SACCHARATE, AMPHETAMINE ASPARTATE MONOHYDRATE, DEXTROAMPHETAMINE SULFATE AND AMPHETAMINE SULFATE 7.5; 7.5; 7.5; 7.5 MG/1; MG/1; MG/1; MG/1
30 CAPSULE, EXTENDED RELEASE ORAL
Qty: 30 CAP | Refills: 0 | Status: SHIPPED | OUTPATIENT
Start: 2020-02-24 | End: 2020-03-16 | Stop reason: SDUPTHER

## 2020-02-24 NOTE — TELEPHONE ENCOUNTER
Requested Prescriptions     Pending Prescriptions Disp Refills    amphetamine-dextroamphetamine XR (ADDERALL XR) 30 mg XR capsule 30 Cap 0     Sig: Take 1 Cap by mouth every morning. Max Daily Amount: 30 mg.

## 2020-03-16 DIAGNOSIS — F90.9 ATTENTION DEFICIT HYPERACTIVITY DISORDER (ADHD), UNSPECIFIED ADHD TYPE: ICD-10-CM

## 2020-03-16 RX ORDER — DEXTROAMPHETAMINE SACCHARATE, AMPHETAMINE ASPARTATE MONOHYDRATE, DEXTROAMPHETAMINE SULFATE AND AMPHETAMINE SULFATE 7.5; 7.5; 7.5; 7.5 MG/1; MG/1; MG/1; MG/1
30 CAPSULE, EXTENDED RELEASE ORAL
Qty: 30 CAP | Refills: 0 | Status: SHIPPED | OUTPATIENT
Start: 2020-05-23 | End: 2020-06-02 | Stop reason: SDUPTHER

## 2020-03-16 RX ORDER — DEXTROAMPHETAMINE SACCHARATE, AMPHETAMINE ASPARTATE MONOHYDRATE, DEXTROAMPHETAMINE SULFATE AND AMPHETAMINE SULFATE 7.5; 7.5; 7.5; 7.5 MG/1; MG/1; MG/1; MG/1
30 CAPSULE, EXTENDED RELEASE ORAL
Qty: 30 CAP | Refills: 0 | Status: SHIPPED | OUTPATIENT
Start: 2020-03-24 | End: 2020-03-16 | Stop reason: SDUPTHER

## 2020-03-16 RX ORDER — DEXTROAMPHETAMINE SACCHARATE, AMPHETAMINE ASPARTATE MONOHYDRATE, DEXTROAMPHETAMINE SULFATE AND AMPHETAMINE SULFATE 7.5; 7.5; 7.5; 7.5 MG/1; MG/1; MG/1; MG/1
30 CAPSULE, EXTENDED RELEASE ORAL
Qty: 30 CAP | Refills: 0 | Status: SHIPPED | OUTPATIENT
Start: 2020-04-23 | End: 2020-03-16 | Stop reason: SDUPTHER

## 2020-04-01 DIAGNOSIS — F90.9 ATTENTION DEFICIT HYPERACTIVITY DISORDER (ADHD), UNSPECIFIED ADHD TYPE: ICD-10-CM

## 2020-04-01 NOTE — TELEPHONE ENCOUNTER
Requested Prescriptions     Pending Prescriptions Disp Refills    amphetamine-dextroamphetamine XR (Adderall XR) 30 mg XR capsule 30 Cap 0     Sig: Take 1 Cap by mouth every morning.  Max Daily Amount: 30 mg.      pt needs this transferred to another pharmacy    [facility] [address]  [phone]

## 2020-04-02 RX ORDER — DEXTROAMPHETAMINE SACCHARATE, AMPHETAMINE ASPARTATE MONOHYDRATE, DEXTROAMPHETAMINE SULFATE AND AMPHETAMINE SULFATE 7.5; 7.5; 7.5; 7.5 MG/1; MG/1; MG/1; MG/1
30 CAPSULE, EXTENDED RELEASE ORAL
Qty: 30 CAP | Refills: 0 | OUTPATIENT
Start: 2020-05-23

## 2020-05-07 ENCOUNTER — VIRTUAL VISIT (OUTPATIENT)
Dept: INTERNAL MEDICINE CLINIC | Age: 25
End: 2020-05-07

## 2020-05-07 DIAGNOSIS — F41.9 ANXIETY: ICD-10-CM

## 2020-05-07 DIAGNOSIS — K21.9 GASTROESOPHAGEAL REFLUX DISEASE, ESOPHAGITIS PRESENCE NOT SPECIFIED: Primary | ICD-10-CM

## 2020-05-07 RX ORDER — OMEPRAZOLE 20 MG/1
20 CAPSULE, DELAYED RELEASE ORAL DAILY
Qty: 90 CAP | Refills: 0 | Status: SHIPPED | OUTPATIENT
Start: 2020-05-07 | End: 2020-07-30

## 2020-05-07 NOTE — PROGRESS NOTES
Daniel Rivas is a 30 y.o. female who was seen by synchronous (real-time) audio-video technology on 5/7/2020. She confirmed that, for purposes of billing, this is a virtual visit with her provider for which we will submit a claim for reimbursement to her insurance company. She is aware that she will be responsible for any copays, coinsurance amounts or other amounts not covered by her insurance company. Do you accept - YES    This visit was completed was completed virtually using [employer] Me      Assessment & Plan:   Diagnoses and all orders for this visit:    1. Gastroesophageal reflux disease, esophagitis presence not specified  -     omeprazole (PRILOSEC) 20 mg capsule; Take 1 Cap by mouth daily. 2. Anxiety    Will start omeprazole for 30 days. She will then use this prn. Discussed starting a medication for anxiety, she is not ready to do this. She will see how things go over the next week or 2. Subjective:   Daniel Rivas was seen for GI Problem    Concern for ulcer: symptoms started a few days ago, they include sensation of warmth in her stomach, she notes an \"intense stomach ache\", sharp pain. She states this happened a year ago for similar symptoms and was diagnosed with reflux. Laying flat makes symptoms worse. She notes pain comes and goes over the last few weeks. She denies fevers, cough, chest pain. She has not tried any medications to help, she tried baking soda and this didn't help. . She notes symptoms come and go. Sick contacts: none    Anxiety: this has been worsening with the pandemic, she notes being tearful and that this is likely making her stomach symptoms worse. Prior to Admission medications    Medication Sig Start Date End Date Taking? Authorizing Provider   amphetamine-dextroamphetamine XR (Adderall XR) 30 mg XR capsule Take 1 Cap by mouth every morning. Max Daily Amount: 30 mg. 5/23/20   Skiff, Christen Members, NP   fluticasone propionate (FLONASE NA) by Nasal route. Provider, Historical   ketoconazole (NIZORAL) 2 % topical cream Apply  to affected area daily. 5/13/19   Skiff, Margretta Nest, NP   fluconazole (DIFLUCAN) 150 mg tablet Take 1 tab today and another in 72 hours. FDA advises cautious prescribing of oral fluconazole in pregnancy. 5/13/19   Skiff, Margretta Nest, NP   cetirizine-pseudoePHEDrine (ZYRTEC-D) 5-120 mg Tb12 Take 1 Tab by mouth two (2) times a day. 4/15/19   Skiff, Margretta Nest, NP   hydrOXYzine HCl (ATARAX) 25 mg tablet 25 mg. 9/26/18   Provider, Historical   LARISSIA 0.1-20 mg-mcg tab  4/13/18   Provider, Historical       Allergies   Allergen Reactions    Codeine Hives    Gluten Hives    Rye Grass Extract Hives       History reviewed. No pertinent past medical history. History reviewed. No pertinent surgical history. ROS - per HPI      Objective:     General: alert, cooperative, no distress   Mental  status: normal mood, behavior, speech, dress, motor activity, and thought processes, able to follow commands   Eyes: EOM intact, normal sclera   Mouth: mucous membranes moist   Neck: no visualized mass   Resp: normal effort and no respiratory distress   Abdomen Self palpation to quadrants was not painful   Neuro: no gross deficits   Musculoskeletal: normal ROM of neck   Skin: no discoloration or lesions of concern on visible areas   Psychiatric: tearful at times, normal affect         Due to this being a TeleHealth evaluation, many elements of the physical examination are unable to be assessed. Kacey Vargas is a 30 y.o. female being evaluated by a Virtual Visit (video visit) encounter to address concerns as mentioned above. A caregiver was present when appropriate. Due to this being a TeleHealth encounter (During TMIGF-08 public health emergency), evaluation of the following organ systems was limited: Vitals/Constitutional/EENT/Resp/CV/GI//MS/Neuro/Skin/Heme-Lymph-Imm.   Pursuant to the emergency declaration under the [address] and the National Emergencies Act, [address] [facility] waiver authority and the [employer] Act, this Virtual Visit was conducted with patient's (and/or legal guardian's) consent, to reduce the risk of exposure to COVID-19 and provide necessary medical care. Services were provided through a video synchronous discussion virtually to substitute for in-person clinic visit. We discussed the expected course, resolution and complications of the diagnosis(es) in detail. Medication risks, benefits, costs, interactions, and alternatives were discussed as indicated. I advised her to contact the office if her condition worsens, changes or fails to improve as anticipated. She expressed understanding with the diagnosis(es) and plan.      Moy Cochran NP

## 2020-05-18 DIAGNOSIS — F41.9 ANXIETY: Primary | ICD-10-CM

## 2020-05-18 RX ORDER — ESCITALOPRAM OXALATE 5 MG/1
5 TABLET ORAL DAILY
Qty: 30 TAB | Refills: 2 | Status: SHIPPED | OUTPATIENT
Start: 2020-05-18 | End: 2020-06-15 | Stop reason: SDUPTHER

## 2020-06-02 DIAGNOSIS — F90.9 ATTENTION DEFICIT HYPERACTIVITY DISORDER (ADHD), UNSPECIFIED ADHD TYPE: ICD-10-CM

## 2020-06-02 RX ORDER — DEXTROAMPHETAMINE SACCHARATE, AMPHETAMINE ASPARTATE MONOHYDRATE, DEXTROAMPHETAMINE SULFATE AND AMPHETAMINE SULFATE 7.5; 7.5; 7.5; 7.5 MG/1; MG/1; MG/1; MG/1
30 CAPSULE, EXTENDED RELEASE ORAL
Qty: 30 CAP | Refills: 0 | Status: SHIPPED | OUTPATIENT
Start: 2020-06-02 | End: 2020-07-07 | Stop reason: SDUPTHER

## 2020-07-07 ENCOUNTER — OFFICE VISIT (OUTPATIENT)
Dept: INTERNAL MEDICINE CLINIC | Age: 25
End: 2020-07-07

## 2020-07-07 VITALS
DIASTOLIC BLOOD PRESSURE: 78 MMHG | WEIGHT: 215.6 LBS | HEIGHT: 65 IN | HEART RATE: 91 BPM | OXYGEN SATURATION: 98 % | TEMPERATURE: 98.2 F | RESPIRATION RATE: 16 BRPM | SYSTOLIC BLOOD PRESSURE: 110 MMHG | BODY MASS INDEX: 35.92 KG/M2

## 2020-07-07 DIAGNOSIS — F41.9 ANXIETY: ICD-10-CM

## 2020-07-07 DIAGNOSIS — E66.9 OBESITY, CLASS II, BMI 35-39.9: ICD-10-CM

## 2020-07-07 DIAGNOSIS — Z00.00 ROUTINE PHYSICAL EXAMINATION: Primary | ICD-10-CM

## 2020-07-07 DIAGNOSIS — F90.9 ATTENTION DEFICIT HYPERACTIVITY DISORDER (ADHD), UNSPECIFIED ADHD TYPE: ICD-10-CM

## 2020-07-07 RX ORDER — DEXTROAMPHETAMINE SACCHARATE, AMPHETAMINE ASPARTATE MONOHYDRATE, DEXTROAMPHETAMINE SULFATE AND AMPHETAMINE SULFATE 7.5; 7.5; 7.5; 7.5 MG/1; MG/1; MG/1; MG/1
30 CAPSULE, EXTENDED RELEASE ORAL
Qty: 30 CAP | Refills: 0 | Status: SHIPPED | OUTPATIENT
Start: 2020-09-05 | End: 2020-09-01 | Stop reason: SDUPTHER

## 2020-07-07 RX ORDER — DEXTROAMPHETAMINE SACCHARATE, AMPHETAMINE ASPARTATE MONOHYDRATE, DEXTROAMPHETAMINE SULFATE AND AMPHETAMINE SULFATE 7.5; 7.5; 7.5; 7.5 MG/1; MG/1; MG/1; MG/1
30 CAPSULE, EXTENDED RELEASE ORAL
Qty: 30 CAP | Refills: 0 | Status: SHIPPED | OUTPATIENT
Start: 2020-07-07 | End: 2020-07-07 | Stop reason: SDUPTHER

## 2020-07-07 RX ORDER — DEXTROAMPHETAMINE SACCHARATE, AMPHETAMINE ASPARTATE MONOHYDRATE, DEXTROAMPHETAMINE SULFATE AND AMPHETAMINE SULFATE 7.5; 7.5; 7.5; 7.5 MG/1; MG/1; MG/1; MG/1
30 CAPSULE, EXTENDED RELEASE ORAL
Qty: 30 CAP | Refills: 0 | Status: SHIPPED | OUTPATIENT
Start: 2020-08-06 | End: 2020-07-07 | Stop reason: SDUPTHER

## 2020-07-07 NOTE — PROGRESS NOTES
Subjective:      Grace Mccrary is a 30 y.o. female who presents today for CPE. Health Maintenance  Immunizations:    Influenza: up to date. Tetanus: up to date. Gardasil: n/a. Cancer screening:   Cervical: reviewed guidelines, UTD. Breast: reviewed guidelines, reviewed SBE with her      Body mass index is 35.88 kg/m². She is working on weight loss  Wt Readings from Last 3 Encounters:   07/07/20 215 lb 9.6 oz (97.8 kg)   10/28/19 234 lb (106.1 kg)   07/22/19 236 lb (107 kg)     Mental Health Review  Patient is seen for ADHD. Current treatment regimen includes: Adderall XR. Medication compliance: all of the time. Pt reports the following side effects: nothing. [facility]  reviewed. Patient is seen for anxiety disorder. Since last visit: she is doing much better with anxiety since starting lexapro. Ongoing symptoms include: intermittent episodes of anxiety. She denies: SI/SA. Reported side effects from the treatment: none. Patient Care Team:  Skiff, Baldemar Gillis, NP as PCP - General (Nurse Practitioner)  Brittni Van NP as PCP - [facility] Provider       The following sections were reviewed & updated as appropriate: PMH, PSH, FH, and SH. No past medical history on file. No past surgical history on file. Review of Systems    A comprehensive review of systems was negative except for that written in the HPI. Objective:     Visit Vitals  /78 (BP 1 Location: Left arm, BP Patient Position: Sitting)   Pulse 91   Temp 98.2 °F (36.8 °C) (Temporal)   Resp 16   Ht 5' 5\" (1.651 m)   Wt 215 lb 9.6 oz (97.8 kg)   LMP 06/19/2020 (Approximate)   SpO2 98%   BMI 35.88 kg/m²     General:  Alert, cooperative, no distress, appears stated age. Head:  Normocephalic, without obvious abnormality, atraumatic. Eyes:  Conjunctivae/corneas clear. PERRL, EOMs intact. Fundi benign. Ears:  Normal TMs and external ear canals both ears. Nose: Nares normal. Septum midline.  Mucosa normal. No drainage or sinus tenderness. Throat: Deferred    Neck: Supple, symmetrical, trachea midline, no adenopathy, thyroid: no enlargement/tenderness/nodules, no carotid bruit and no JVD. Back:   Symmetric, no curvature. ROM normal. No CVA tenderness. Lungs:   Clear to auscultation bilaterally. Chest wall:  No tenderness or deformity. Heart:  Regular rate and rhythm, S1, S2 normal, no murmur, click, rub or gallop. Abdomen:   Soft, non-tender. Bowel sounds normal. No masses,  No organomegaly. Extremities: Extremities normal, atraumatic, no cyanosis or edema. Pulses: 2+ and symmetric all extremities. Skin: Skin color, texture, turgor normal. No rashes or lesions. Lymph nodes: Cervical, supraclavicular, and axillary nodes normal.   Neurologic: CNII-XII intact. Normal strength, sensation and reflexes throughout. Nursing note and vitals reviewed  Assessment/Plan:   1. Routine physical examination  - CBC WITH AUTOMATED DIFF  - LIPID PANEL  - METABOLIC PANEL, COMPREHENSIVE  - HEMOGLOBIN A1C W/O EAG  - TSH 3RD GENERATION    2. Obesity, Class II, BMI 35-39.9  -weight loss effort encouraged!  - HEMOGLOBIN A1C W/O EAG    3. Attention deficit hyperactivity disorder (ADHD), unspecified ADHD type  -stable, 3 months of scripts ordered  - amphetamine-dextroamphetamine XR (Adderall XR) 30 mg XR capsule; Take 1 Cap by mouth every morning. Max Daily Amount: 30 mg. Dispense: 30 Cap; Refill: 0    4. Anxiety  - she is doing well on lexapro    Follow-up and Dispositions    · Return in about 6 months (around 1/7/2021) for ADHD. Advised her to call back or return to office if symptoms worsen/change/persist.  Discussed expected course/resolution/complications of diagnosis in detail with patient. Medication risks/benefits/costs/interactions/alternatives discussed with patient. She was given an after visit summary which includes diagnoses, current medications, & vitals.   She expressed understanding with the diagnosis and plan.

## 2020-07-07 NOTE — PROGRESS NOTES
Chief Complaint   Patient presents with    Complete Physical     Reviewed record in preparation for visit and have obtained necessary documentation. Identified pt with two pt identifiers(name and ). There are no preventive care reminders to display for this patient. Chief Complaint   Patient presents with    Complete Physical        Wt Readings from Last 3 Encounters:   20 215 lb 9.6 oz (97.8 kg)   10/28/19 234 lb (106.1 kg)   19 236 lb (107 kg)     Temp Readings from Last 3 Encounters:   20 98.2 °F (36.8 °C) (Temporal)   19 99.1 °F (37.3 °C) (Oral)   19 99.2 °F (37.3 °C) (Oral)     BP Readings from Last 3 Encounters:   20 110/78   19 100/66   19 102/69     Pulse Readings from Last 3 Encounters:   20 91   19 77   19 80           Learning Assessment:  :     Learning Assessment 3/3/2017   PRIMARY LEARNER Patient   HIGHEST LEVEL OF EDUCATION - PRIMARY LEARNER  4 YEARS OF COLLEGE   BARRIERS PRIMARY LEARNER NONE   CO-LEARNER CAREGIVER No   PRIMARY LANGUAGE ENGLISH   LEARNER PREFERENCE PRIMARY LISTENING   ANSWERED BY patient   RELATIONSHIP SELF       Depression Screening:  :     3 most recent PHQ Screens 10/28/2019   Little interest or pleasure in doing things Several days   Feeling down, depressed, irritable, or hopeless Several days   Total Score PHQ 2 2       Fall Risk Assessment:  :     No flowsheet data found. Abuse Screening:  :     No flowsheet data found.     Coordination of Care Questionnaire:  :     1) Have you been to an emergency room, urgent care clinic since your last visit? no   Hospitalized since your last visit? no             2) Have you seen or consulted any other health care providers outside of [address] [place] [address] since your last visit? no  (Include any pap smears or colon screenings in this section.)    3) Do you have an Advance Directive on file? no    4) Are you interested in receiving information on Advance Directives? NO      Patient is accompanied by self I have received verbal consent from Eladio Ramirez to discuss any/all medical information while they are present in the room. Reviewed record  In preparation for visit and have obtained necessary documentation.

## 2020-07-30 DIAGNOSIS — K21.9 GASTROESOPHAGEAL REFLUX DISEASE, ESOPHAGITIS PRESENCE NOT SPECIFIED: ICD-10-CM

## 2020-07-30 RX ORDER — OMEPRAZOLE 20 MG/1
CAPSULE, DELAYED RELEASE ORAL
Qty: 90 CAP | Refills: 0 | Status: SHIPPED | OUTPATIENT
Start: 2020-07-30

## 2020-08-04 RX ORDER — HYDROXYZINE 25 MG/1
25 TABLET, FILM COATED ORAL
Refills: 0 | OUTPATIENT
Start: 2020-08-04

## 2020-08-24 ENCOUNTER — TELEPHONE (OUTPATIENT)
Dept: INTERNAL MEDICINE CLINIC | Age: 25
End: 2020-08-24

## 2020-08-24 NOTE — TELEPHONE ENCOUNTER
[phone]    The patient recently lost her insurance.  What will she need to do to continue taking her med's

## 2020-08-25 RX ORDER — CETIRIZINE HCL 10 MG
10 TABLET ORAL
COMMUNITY

## 2020-08-25 NOTE — TELEPHONE ENCOUNTER
Returned patient's call regarding medication and OV since she has lost her insurance (2 identifiers used). Patient states that the medications she is concerned about are the Adderal XR and escitalopram.  She is using OTC products for all of the other medications and has a good supply of those on hand. Discussed with her using goodrx coupons and the process and how [facility] and [facility] appeared to offer the greatest savings. Recommended that when she is almost out of the escitalopram to take the bottle to the pharmacy and have the pharmacist transfer the prescription and I will send a message to Milton Franco regarding the Adderal XR. Patient would like to use the [facility] on [address] for the Adderall XR. Patient reports this was last filled on 8/9/20 for 30. Patient verbalized understanding.     Katherine Bolanos, PharmD, BCACP    CLINICAL PHARMACY CONSULT: MED RECONCILIATION/REVIEW ADDENDUM    For Pharmacy Admin Tracking Only    PHSO: PHSO Patient?: No  Total # of Interventions Recommended: Count: 1  Total Interventions Accepted: 1  Time Spent (min): 15    Sarah Le, DAMIAND, BCACP

## 2020-09-01 DIAGNOSIS — F90.9 ATTENTION DEFICIT HYPERACTIVITY DISORDER (ADHD), UNSPECIFIED ADHD TYPE: ICD-10-CM

## 2020-09-01 RX ORDER — DEXTROAMPHETAMINE SACCHARATE, AMPHETAMINE ASPARTATE MONOHYDRATE, DEXTROAMPHETAMINE SULFATE AND AMPHETAMINE SULFATE 7.5; 7.5; 7.5; 7.5 MG/1; MG/1; MG/1; MG/1
30 CAPSULE, EXTENDED RELEASE ORAL
Qty: 30 CAP | Refills: 0 | Status: SHIPPED | OUTPATIENT
Start: 2020-09-05 | End: 2020-09-23 | Stop reason: SDUPTHER

## 2020-09-23 DIAGNOSIS — F90.9 ATTENTION DEFICIT HYPERACTIVITY DISORDER (ADHD), UNSPECIFIED ADHD TYPE: ICD-10-CM

## 2020-09-23 DIAGNOSIS — F41.9 ANXIETY: ICD-10-CM

## 2020-09-23 RX ORDER — DEXTROAMPHETAMINE SACCHARATE, AMPHETAMINE ASPARTATE MONOHYDRATE, DEXTROAMPHETAMINE SULFATE AND AMPHETAMINE SULFATE 7.5; 7.5; 7.5; 7.5 MG/1; MG/1; MG/1; MG/1
30 CAPSULE, EXTENDED RELEASE ORAL
Qty: 30 CAP | Refills: 0 | Status: SHIPPED | OUTPATIENT
Start: 2020-10-05

## 2020-09-23 RX ORDER — ESCITALOPRAM OXALATE 5 MG/1
5 TABLET ORAL DAILY
Qty: 90 TAB | Refills: 2 | Status: SHIPPED | OUTPATIENT
Start: 2020-09-23

## 2020-11-23 ENCOUNTER — TELEPHONE (OUTPATIENT)
Dept: INTERNAL MEDICINE CLINIC | Age: 25
End: 2020-11-23

## 2020-11-23 NOTE — TELEPHONE ENCOUNTER
Pt is moving to [facility] and is asking Juice Aragon to recommend a new PCP down there. Please call.

## 2020-11-25 ENCOUNTER — PATIENT MESSAGE (OUTPATIENT)
Dept: INTERNAL MEDICINE CLINIC | Age: 25
End: 2020-11-25

## 2022-03-18 PROBLEM — F41.9 ANXIETY: Status: ACTIVE | Noted: 2020-07-07

## 2022-03-19 PROBLEM — E66.9 OBESITY, CLASS II, BMI 35-39.9: Status: ACTIVE | Noted: 2018-04-30

## 2022-03-20 PROBLEM — F90.9 ADHD (ATTENTION DEFICIT HYPERACTIVITY DISORDER): Status: ACTIVE | Noted: 2017-03-03

## 2022-03-20 PROBLEM — E66.01 SEVERE OBESITY (HCC): Status: ACTIVE | Noted: 2018-11-07

## 2022-03-20 PROBLEM — L50.1 IDIOPATHIC URTICARIA: Status: ACTIVE | Noted: 2017-06-26

## 2023-05-20 RX ORDER — CETIRIZINE HYDROCHLORIDE 10 MG/1
10 TABLET ORAL DAILY PRN
COMMUNITY

## 2023-05-20 RX ORDER — OMEPRAZOLE 20 MG/1
1 CAPSULE, DELAYED RELEASE ORAL DAILY
COMMUNITY
Start: 2020-07-30

## 2023-05-20 RX ORDER — ESCITALOPRAM OXALATE 5 MG/1
5 TABLET ORAL DAILY
COMMUNITY
Start: 2020-09-23

## 2023-05-20 RX ORDER — DEXTROAMPHETAMINE SACCHARATE, AMPHETAMINE ASPARTATE MONOHYDRATE, DEXTROAMPHETAMINE SULFATE AND AMPHETAMINE SULFATE 7.5; 7.5; 7.5; 7.5 MG/1; MG/1; MG/1; MG/1
30 CAPSULE, EXTENDED RELEASE ORAL
COMMUNITY
Start: 2020-10-05